# Patient Record
Sex: FEMALE | Race: BLACK OR AFRICAN AMERICAN | NOT HISPANIC OR LATINO | Employment: OTHER | URBAN - METROPOLITAN AREA
[De-identification: names, ages, dates, MRNs, and addresses within clinical notes are randomized per-mention and may not be internally consistent; named-entity substitution may affect disease eponyms.]

---

## 2017-01-31 ENCOUNTER — OFFICE VISIT (OUTPATIENT)
Dept: INTERNAL MEDICINE | Facility: CLINIC | Age: 72
End: 2017-01-31

## 2017-01-31 VITALS
DIASTOLIC BLOOD PRESSURE: 88 MMHG | SYSTOLIC BLOOD PRESSURE: 143 MMHG | WEIGHT: 178.4 LBS | HEART RATE: 52 BPM | BODY MASS INDEX: 32.83 KG/M2 | HEIGHT: 62 IN

## 2017-01-31 DIAGNOSIS — I10 BENIGN ESSENTIAL HYPERTENSION: Primary | ICD-10-CM

## 2017-01-31 PROCEDURE — 99213 OFFICE O/P EST LOW 20 MIN: CPT | Performed by: INTERNAL MEDICINE

## 2017-01-31 RX ORDER — DILTIAZEM HYDROCHLORIDE 300 MG/1
300 CAPSULE, COATED, EXTENDED RELEASE ORAL DAILY
Qty: 90 CAPSULE | Refills: 1 | Status: SHIPPED | OUTPATIENT
Start: 2017-01-31 | End: 2017-09-10

## 2017-01-31 NOTE — PROGRESS NOTES
Subjective   Christine Quick is a 71 y.o. female.     Chief Complaint   Patient presents with   • Hypertension       History of Present Illness  F/u HTN. BP is high but states she was rushing today to get here. She denies CP, SOA, HA or blurry vision.  She does not check her BP at home.    Review of Systems   Constitutional: Negative for chills and fatigue.   Respiratory: Negative for shortness of breath.    Cardiovascular: Negative for chest pain and leg swelling.   Gastrointestinal: Negative for abdominal distention, abdominal pain, constipation and diarrhea.   Psychiatric/Behavioral: Negative for sleep disturbance and suicidal ideas. The patient is not nervous/anxious.    All other systems reviewed and are negative.      The following portions of the patient's history were reviewed and updated as appropriate: past family history, past medical history, past social history and past surgical history.    Objective   Physical Exam   Constitutional: She is oriented to person, place, and time. She appears well-developed and well-nourished. No distress.   Cardiovascular: Normal rate, regular rhythm and normal heart sounds.    No murmur heard.  Pulmonary/Chest: Effort normal and breath sounds normal. No respiratory distress. She has no wheezes.   Abdominal: Soft. Bowel sounds are normal. She exhibits no distension. There is no tenderness.   Musculoskeletal: She exhibits no edema.   Neurological: She is alert and oriented to person, place, and time.   Skin: She is not diaphoretic.   Psychiatric: She has a normal mood and affect. Her behavior is normal.   Nursing note and vitals reviewed.      Assessment/Plan   Christine was seen today for hypertension.    Diagnoses and all orders for this visit:    Benign essential hypertension  -     diltiaZEM CD (CARDIZEM CD) 300 MG 24 hr capsule; Take 1 capsule by mouth Daily.    -HTN: BP high even with repeat BP check (145/90). Will increase Diltiazem from 240 mg to 300 mg daily. Monitor BP  at home and send or bring in BP log in 1 month. Goal BP reading of <140/90. Low Na and caffeine intake.

## 2017-01-31 NOTE — MR AVS SNAPSHOT
Christine Quick   1/31/2017 10:20 AM   Office Visit    Dept Phone:  [phone]   Encounter #:  [id number]    Provider:  Radha Rodriguez MD   Department:  [facility] INTERNAL MEDICINE                Your Full Care Plan              Today's Medication Changes          These changes are accurate as of: 1/31/17 10:52 AM.  If you have any questions, ask your nurse or doctor.               Medication(s)that have changed:     diltiaZEM  MG 24 hr capsule   Commonly known as:  CARDIZEM CD   Take 1 capsule by mouth Daily.   What changed:    - medication strength  - how much to take  - Another medication with the same name was removed. Continue taking this medication, and follow the directions you see here.   Changed by:  Radha Rodriguez MD            Where to Get Your Medications      These medications were sent to [facility] [place] [address] [place] [address] [address] [facility] [address] [facility] [address] [facility] - [phone]  - [phone]   [address] [address]     Phone:  [phone]     diltiaZEM  MG 24 hr capsule                  Your Updated Medication List          This list is accurate as of: 1/31/17 10:52 AM.  Always use your most recent med list.                B-complex with vitamin C tablet       calcium carbonate-vitamin d 600-400 MG-UNIT per tablet       desonide 0.05 % cream   Commonly known as:  DESOWEN       diltiaZEM  MG 24 hr capsule   Commonly known as:  CARDIZEM CD   Take 1 capsule by mouth Daily.       fexofenadine 60 MG tablet   Commonly known as:  ALLEGRA       Flaxseed Oil 1200 MG capsule       MULTI VITAMIN DAILY PO       prednisoLONE acetate 1 % ophthalmic suspension   Commonly known as:  PRED FORTE       triamterene-hydrochlorothiazide 75-50 MG per tablet   Commonly known as:  MAXZIDE   TAKE 1/2 TABLET BY MOUTH EVERY DAY       Vitamin D3 3000 UNITS tablet               You Were Diagnosed With        "Codes Comments    Benign essential hypertension    -  Primary ICD-10-CM: I10  ICD-9-CM: 401.1       Instructions     None    Patient Instructions History      Upcoming Appointments     Visit Type Date Time Department    OFFICE VISIT 2017 10:20 AM MARIA ESTHER MILNER      [facility] Signup     [facility] allows you to send messages to your doctor, view your test results, renew your prescriptions, schedule appointments, and more. To sign up, go to [employer] and click on the Sign Up Now link in the New User? box. Enter your [facility] Activation Code exactly as it appears below along with the last four digits of your Social Security Number and your Date of Birth () to complete the sign-up process. If you do not sign up before the expiration date, you must request a new code.    [facility] Activation Code: [email or web address]  Expires: 2017 10:52 AM    If you have questions, you can email [email or web address] or call [phone] to talk to our [facility] staff. Remember, [facility] is NOT to be used for urgent needs. For medical emergencies, dial 911.               Other Info from Your Visit           Allergies     Albumen, Egg      Amlodipine  Swelling      Reason for Visit     Hypertension           Vital Signs     Blood Pressure Pulse Height Weight Body Mass Index Smoking Status    143/88 (BP Location: Left arm, Patient Position: Sitting, Cuff Size: Adult) 52 62\" (157.5 cm) 178 lb 6.4 oz (80.9 kg) 32.63 kg/m2 Never Smoker      Problems and Diagnoses Noted     Benign essential hypertension        "

## 2017-10-31 ENCOUNTER — OFFICE VISIT (OUTPATIENT)
Dept: FAMILY MEDICINE CLINIC | Facility: CLINIC | Age: 72
End: 2017-10-31

## 2017-10-31 VITALS
TEMPERATURE: 97.1 F | DIASTOLIC BLOOD PRESSURE: 92 MMHG | OXYGEN SATURATION: 95 % | HEART RATE: 63 BPM | BODY MASS INDEX: 32.53 KG/M2 | HEIGHT: 63 IN | SYSTOLIC BLOOD PRESSURE: 183 MMHG | WEIGHT: 183.6 LBS

## 2017-10-31 DIAGNOSIS — Z11.59 ENCOUNTER FOR HEPATITIS C SCREENING TEST FOR LOW RISK PATIENT: ICD-10-CM

## 2017-10-31 DIAGNOSIS — I10 BENIGN ESSENTIAL HYPERTENSION: Primary | ICD-10-CM

## 2017-10-31 DIAGNOSIS — E66.9 OBESITY (BMI 30-39.9): ICD-10-CM

## 2017-10-31 DIAGNOSIS — Z13.1 SCREENING FOR DIABETES MELLITUS: ICD-10-CM

## 2017-10-31 DIAGNOSIS — Z13.220 SCREENING FOR HYPERLIPIDEMIA: ICD-10-CM

## 2017-10-31 PROCEDURE — 99214 OFFICE O/P EST MOD 30 MIN: CPT | Performed by: FAMILY MEDICINE

## 2017-10-31 RX ORDER — HYDROCHLOROTHIAZIDE 25 MG/1
25 TABLET ORAL DAILY
Qty: 30 TABLET | Refills: 1 | Status: SHIPPED | OUTPATIENT
Start: 2017-10-31 | End: 2017-11-13 | Stop reason: ALTCHOICE

## 2017-10-31 RX ORDER — DILTIAZEM HYDROCHLORIDE 240 MG/1
240 CAPSULE, COATED, EXTENDED RELEASE ORAL DAILY
Qty: 30 CAPSULE | Refills: 2 | Status: SHIPPED | OUTPATIENT
Start: 2017-10-31 | End: 2018-03-30 | Stop reason: SDUPTHER

## 2017-10-31 NOTE — PROGRESS NOTES
Subjective   Christine Quick is a 72 y.o. female.  Patient presented to the office alone today to establish care.  Her prior PCP Dr. Devine is retired.  She has long-standing history of hypertension, eczema, and chronic seasonal allergies.    Chief Complaint   Patient presents with   • Hypertension     new pt get established         History of Present Illness  Hypertension  Patient has been taking diltiazem 240 mg extended release and triamterene hydrochlorothiazide 75-50 milligrams daily without dose change for the last at least several years.  She has not had her blood pressure medication yet today.  Her last dose was yesterday afternoon.  She usually doesn't have trouble taking it daily but she had a meeting today and has been doing a lot of running around.  She says usually she doesn't have trouble with her blood pressure being elevated at doctor's appointments.  She does note that when she takes the medications together she is not necessarily dizzy but does feel a little bit funny on the inside.  She also notices that she has been extra tired following the dose of blood pressure medications for about 2-3 hours and then it improves.  This has been going on for months.  She does have some trouble with the triamterene and hydrochlorothiazide dosing because if she takes it too late in the day it will affect her overnight related to increased need of using the restroom to urinate.  She denies headache, change in vision, chest pain, shortness of breath.    Eczema  She had severe eczema as a child.  It has steadily improved with age according to her.  She still sees dermatology for that.  She says it tends to be exacerbated by stressful events in her life.  During these times she just uses topical desonide.  Otherwise she doesn't require any special creams consistently.    Allergic rhinitis  Patient is currently experiencing some left-sided ear and throat discomfort.  She is not really having pain.  She does not complain  of nasal congestion, cough, sore throat, sinus pressure, headaches, fever, or chills.  When she has symptoms such as the ones going on now, she takes fexofenadine over-the-counter once daily but does not do this consistently.  He is to help her with her symptoms and if she has a headache related to the symptoms.    Recurrent eye infections  For this problem she is seen by ophthalmology, Dr. Castellano.  She reported it was explained to her she has like a herpes infection of the eye.  This only affects the right eye.  It is intermittent in frequency.  For her symptoms she takes a steroid eyedrop that has been prescribed to her.  She begins the drops at the onset of increased blurry vision, redness, and watery eye and notices improvement over the course of a few days.  Her vision in her right eye is intact but notably worse than the left.      The following portions of the patient's history were reviewed and updated as appropriate: allergies, current medications, past family history, past medical history, past social history, past surgical history and problem list.      Review of Systems   Constitutional: Negative for appetite change, chills, fatigue, fever and unexpected weight change.        Patient is working on increasing her activity.  She and her  own a cleaning company, and she has a daily job that involves walking about 1 mile, and up and down 60 steps which she did yesterday and had no trouble with.   HENT: Negative for congestion, ear pain, postnasal drip, rhinorrhea, sinus pain, sinus pressure, sore throat, trouble swallowing and voice change.    Eyes: Positive for redness and visual disturbance. Negative for discharge.        Patient has intermittent flare of recurrent eye viral infection.  She is currently having a mild exacerbation and is experiencing some blurry vision and redness.   Respiratory: Negative for cough and shortness of breath.    Cardiovascular: Negative for chest pain and leg swelling.  "  Gastrointestinal: Negative for abdominal pain, blood in stool, constipation, diarrhea, nausea and vomiting.   Genitourinary: Positive for urgency. Negative for decreased urine volume, difficulty urinating, dysuria and frequency.   Musculoskeletal: Negative for back pain, gait problem and joint swelling.   Skin: Negative for rash and wound.   Neurological: Negative for dizziness and headaches.   Psychiatric/Behavioral: Positive for sleep disturbance. Negative for dysphoric mood. The patient is not nervous/anxious.        Objective   Blood pressure (!) 183/92, pulse 63, temperature 97.1 °F (36.2 °C), height 63\" (160 cm), weight 183 lb 9.6 oz (83.3 kg), SpO2 95 %.  Physical Exam   Constitutional: She is oriented to person, place, and time. She appears well-nourished. No distress.   HENT:   Right Ear: External ear normal.   Left Ear: External ear normal.   Nose: Nose normal.   Eyes: Conjunctivae and EOM are normal. Right eye exhibits no discharge. Left eye exhibits no discharge. No scleral icterus.   Slight conjunctival injection right greater than left.   Neck: Neck supple. No thyromegaly present.   Cardiovascular: Normal rate, regular rhythm, normal heart sounds and intact distal pulses.  Exam reveals no gallop and no friction rub.    No murmur heard.  Pulmonary/Chest: Effort normal and breath sounds normal. No respiratory distress. She has no wheezes. She has no rales.   Abdominal: Soft. Bowel sounds are normal. She exhibits no distension and no mass. There is no tenderness. There is no guarding.   Musculoskeletal: She exhibits no edema or deformity.   No tenderness along the spine.   Lymphadenopathy:     She has no cervical adenopathy.   Neurological: She is alert and oriented to person, place, and time. She exhibits normal muscle tone. Coordination normal.   Skin: Skin is warm and dry.   Psychiatric: She has a normal mood and affect. Her behavior is normal.       Assessment/Plan   Christine was seen today for " hypertension.    Diagnoses and all orders for this visit:    Benign essential hypertension  -     diltiaZEM CD (CARDIZEM CD) 240 MG 24 hr capsule; Take 1 capsule by mouth Daily.  -     CBC & Differential  -     Comprehensive Metabolic Panel    Obesity (BMI 30-39.9)  -     CBC & Differential  -     Comprehensive Metabolic Panel  -     Lipid Panel    Screening for hyperlipidemia  -     Lipid Panel    Encounter for hepatitis C screening test for low risk patient  -     Hepatitis C Antibody    Screening for diabetes mellitus  -     Comprehensive Metabolic Panel    Other orders  -     hydrochlorothiazide (HYDRODIURIL) 25 MG tablet; Take 1 tablet by mouth Daily.      Hypertension repeat blood pressure in office was 170/92  #1 reviewed other blood pressures in patient's chart.  Systolic blood pressure not noted to be higher than 149.  #2 some concern with compliance given that she has symptoms of fatigue and not feeling well when they're taken together, and having trouble taking the hydrochlorothiazide triamterene late in the day because of urinary symptoms at night.  #3 discussed medication options at length including dosing time of current medicines, splitting current medicines into individual doses, changing medications to another class including ACE inhibitor or ARB because it's unclear why the patient is not on one of these classes  #4 we'll continue diltiazem 240 mg extended release for patient to take in the morning, will maintain hydrochlorothiazide 25 mg dose daily, patient will take this late morning or at lunchtime and avoid taking it later than lunch as it will affect her sleep  #5 patient was enthusiastic about this routine and said she will work to be compliant.  #6 we'll follow-up on her blood pressure with this change in medication in 2 weeks

## 2017-11-13 ENCOUNTER — OFFICE VISIT (OUTPATIENT)
Dept: FAMILY MEDICINE CLINIC | Facility: CLINIC | Age: 72
End: 2017-11-13

## 2017-11-13 VITALS
OXYGEN SATURATION: 97 % | WEIGHT: 181.5 LBS | TEMPERATURE: 98.6 F | HEIGHT: 63 IN | DIASTOLIC BLOOD PRESSURE: 80 MMHG | SYSTOLIC BLOOD PRESSURE: 135 MMHG | BODY MASS INDEX: 32.16 KG/M2 | HEART RATE: 54 BPM

## 2017-11-13 DIAGNOSIS — I10 BENIGN ESSENTIAL HYPERTENSION: Primary | ICD-10-CM

## 2017-11-13 PROCEDURE — 99213 OFFICE O/P EST LOW 20 MIN: CPT | Performed by: FAMILY MEDICINE

## 2017-11-13 RX ORDER — TRIAMTERENE AND HYDROCHLOROTHIAZIDE 37.5; 25 MG/1; MG/1
2 TABLET ORAL DAILY
Qty: 60 TABLET | Refills: 3 | Status: SHIPPED | OUTPATIENT
Start: 2017-11-13 | End: 2018-03-18 | Stop reason: SDUPTHER

## 2017-11-13 NOTE — PROGRESS NOTES
"Subjective   Christine Quick is a 72 y.o. female.  She presented for follow-up of her hypertension.    Chief Complaint   Patient presents with   • Hypertension     2 week follow up        History of Present Illness  Hypertension  Patient has been taking her diltiazem 240 mg extended release and her triamterene hydrochlorothiazide 75-50 every morning.  Last time she reported dizziness with taking both of her medications at the same time but she has had no symptoms like this.  She has been exercising and walking more.  She does not get any chest pain or excessive shortness of breath with these activities.  She reports overall feeling very well since the last appointment.  She had not picked up the hydrochlorothiazide 25 mg from last appointment until yesterday.  She has not yet taken this.  She also stated that her urinary complaints and increased urinary frequency overnight was not a problem when taking her triamterene hydrochlorothiazide in the morning.  She would prefer to continue on her current regimen as opposed to change medications at this time.  She denied headache, changes in her baseline vision, lower extremity swelling.    She has history of shingles and is concerned about getting the zoster vaccine.  She said she has a history of egg allergy, but she currently tolerates eating eggs prepared in food.  He has avoided flu vaccine and other vaccines because of her history of egg allergy.    The following portions of the patient's history were reviewed and updated as appropriate: allergies, current medications, past surgical history and problem list.          Review of Systems   Constitutional: Negative for activity change and unexpected weight change.   Respiratory: Negative for shortness of breath.    Cardiovascular: Negative for chest pain and leg swelling.   Neurological: Negative for dizziness and headaches.       Objective   Blood pressure 135/80, pulse 54, temperature 98.6 °F (37 °C), height 63\" (160 cm), " weight 181 lb 8 oz (82.3 kg), SpO2 97 %.  Physical Exam   Constitutional: She appears well-nourished. No distress.   Eyes: Conjunctivae are normal. Right eye exhibits no discharge. Left eye exhibits no discharge. No scleral icterus.   Cardiovascular: Normal rate, regular rhythm and normal heart sounds.  Exam reveals no gallop and no friction rub.    No murmur heard.  Pulmonary/Chest: Effort normal and breath sounds normal. No respiratory distress.   Musculoskeletal: She exhibits no edema.   Vitals reviewed.      Assessment/Plan   Christine was seen today for hypertension.    Diagnoses and all orders for this visit:    Benign essential hypertension    Other orders  -     triamterene-hydrochlorothiazide (MAXZIDE-25) 37.5-25 MG per tablet; Take 2 tablets by mouth Daily.      Blood pressure shows good control today.  There is no need to change patient's regimen as she is tolerating it well at this time.  Will refill her triamterene hydrochlorothiazide and cancel the hydrochlorothiazide 25 mg.  Discussed could've been some dehydration from acute illness likely viral that she was experiencing last time with feeling tired and dizzy when she took all of her blood pressure medications.  Patient encouraged to continue her increased physical activity.  She was also counseled on benefits of vaccinations, and despite her egg allergy vaccines would be recommended for her.  She said she did not want to vaccine today and she will think about it.  To return for follow-up in the office in 3 months.

## 2018-01-15 ENCOUNTER — TELEPHONE (OUTPATIENT)
Dept: FAMILY MEDICINE CLINIC | Facility: CLINIC | Age: 73
End: 2018-01-15

## 2018-02-06 ENCOUNTER — TELEPHONE (OUTPATIENT)
Dept: FAMILY MEDICINE CLINIC | Facility: CLINIC | Age: 73
End: 2018-02-06

## 2018-02-06 NOTE — TELEPHONE ENCOUNTER
Patient called requesting a colonoscopy referral sent in and has an appointment for fasting labs on 2/14, patient did not say which labs she needed for her insurance.

## 2018-02-07 DIAGNOSIS — I10 BENIGN ESSENTIAL HYPERTENSION: Primary | ICD-10-CM

## 2018-02-07 DIAGNOSIS — Z12.11 SCREENING FOR COLON CANCER: ICD-10-CM

## 2018-02-07 DIAGNOSIS — E66.9 OBESITY (BMI 30-39.9): ICD-10-CM

## 2018-02-07 DIAGNOSIS — R73.01 IMPAIRED FASTING GLUCOSE: ICD-10-CM

## 2018-02-14 LAB
ALBUMIN SERPL-MCNC: 4.2 G/DL (ref 3.5–5.2)
ALBUMIN/GLOB SERPL: 1.6 G/DL
ALP SERPL-CCNC: 85 U/L (ref 39–117)
ALT SERPL-CCNC: 21 U/L (ref 1–33)
AST SERPL-CCNC: 27 U/L (ref 1–32)
BILIRUB SERPL-MCNC: 0.4 MG/DL (ref 0.1–1.2)
BUN SERPL-MCNC: 18 MG/DL (ref 8–23)
BUN/CREAT SERPL: 25 (ref 7–25)
CALCIUM SERPL-MCNC: 9.3 MG/DL (ref 8.6–10.5)
CHLORIDE SERPL-SCNC: 100 MMOL/L (ref 98–107)
CHOLEST SERPL-MCNC: 202 MG/DL (ref 0–200)
CO2 SERPL-SCNC: 31.2 MMOL/L (ref 22–29)
CREAT SERPL-MCNC: 0.72 MG/DL (ref 0.57–1)
GFR SERPLBLD CREATININE-BSD FMLA CKD-EPI: 79 ML/MIN/1.73
GFR SERPLBLD CREATININE-BSD FMLA CKD-EPI: 96 ML/MIN/1.73
GLOBULIN SER CALC-MCNC: 2.7 GM/DL
GLUCOSE SERPL-MCNC: 89 MG/DL (ref 65–99)
HBA1C MFR BLD: 5.42 % (ref 4.8–5.6)
HDLC SERPL-MCNC: 95 MG/DL (ref 40–60)
LDLC SERPL CALC-MCNC: 95 MG/DL (ref 0–100)
POTASSIUM SERPL-SCNC: 3.7 MMOL/L (ref 3.5–5.2)
PROT SERPL-MCNC: 6.9 G/DL (ref 6–8.5)
SODIUM SERPL-SCNC: 143 MMOL/L (ref 136–145)
TRIGL SERPL-MCNC: 62 MG/DL (ref 0–150)
VLDLC SERPL CALC-MCNC: 12.4 MG/DL (ref 5–40)

## 2018-03-01 ENCOUNTER — PREP FOR SURGERY (OUTPATIENT)
Dept: OTHER | Facility: HOSPITAL | Age: 73
End: 2018-03-01

## 2018-03-01 DIAGNOSIS — Z12.11 SCREEN FOR COLON CANCER: Primary | ICD-10-CM

## 2018-03-05 PROBLEM — Z12.11 SCREEN FOR COLON CANCER: Status: ACTIVE | Noted: 2018-03-05

## 2018-03-19 RX ORDER — TRIAMTERENE AND HYDROCHLOROTHIAZIDE 37.5; 25 MG/1; MG/1
2 TABLET ORAL DAILY
Qty: 60 TABLET | Refills: 0 | Status: SHIPPED | OUTPATIENT
Start: 2018-03-19 | End: 2018-03-30

## 2018-03-28 ENCOUNTER — TELEPHONE (OUTPATIENT)
Dept: SURGERY | Facility: CLINIC | Age: 73
End: 2018-03-28

## 2018-03-28 NOTE — TELEPHONE ENCOUNTER
Left message on cell phone. Tried to reach patient on home # twice with no success. Left her a message regarding her c-scope and apologized that I did not know she cx her procedure( she may have spoken to Yudy or answering service) but most patients call back at a later date to r/s.

## 2018-03-29 ENCOUNTER — PREP FOR SURGERY (OUTPATIENT)
Dept: OTHER | Facility: HOSPITAL | Age: 73
End: 2018-03-29

## 2018-03-29 ENCOUNTER — TELEPHONE (OUTPATIENT)
Dept: SURGERY | Facility: CLINIC | Age: 73
End: 2018-03-29

## 2018-03-29 DIAGNOSIS — Z12.11 SCREENING FOR MALIGNANT NEOPLASM OF COLON: Primary | ICD-10-CM

## 2018-03-29 NOTE — TELEPHONE ENCOUNTER
Orders have been put in.  Okay to use to make citrate prep this time.  I think if she has further numbness in her arm she should probably contact her primary care physician.  Dwayne to see how that would be related to her prep.

## 2018-03-29 NOTE — TELEPHONE ENCOUNTER
Pt was scheduled for c-scope last Thursday. Had a very bad reaction to prep(she had taken Miralax since Suprep was not covered by insurance). She states she was violently vomiting, had numbness in her arm and became extremely weak. Told her I would speak with you on what to suggest as she is very anxious and slightly scared of doing another prep. She did state in the past she has used Mag Citrate and apparently that was ok for her. Should we try to r/s c-scope and do Mag cit prep? Her biggest concern in all of this was the fact of the numbness in her arm. Says she is still trying to recover from feeling so bad last week. If ok to do c-scope please put orders in. Or please let me know if we need to do something else.

## 2018-03-30 ENCOUNTER — OFFICE VISIT (OUTPATIENT)
Dept: FAMILY MEDICINE CLINIC | Facility: CLINIC | Age: 73
End: 2018-03-30

## 2018-03-30 VITALS
HEIGHT: 63 IN | HEART RATE: 68 BPM | OXYGEN SATURATION: 99 % | BODY MASS INDEX: 31.71 KG/M2 | DIASTOLIC BLOOD PRESSURE: 72 MMHG | WEIGHT: 179 LBS | SYSTOLIC BLOOD PRESSURE: 130 MMHG

## 2018-03-30 DIAGNOSIS — I10 BENIGN ESSENTIAL HYPERTENSION: ICD-10-CM

## 2018-03-30 DIAGNOSIS — R35.0 URINE FREQUENCY: ICD-10-CM

## 2018-03-30 DIAGNOSIS — Z12.11 ENCOUNTER FOR SCREENING COLONOSCOPY: Primary | ICD-10-CM

## 2018-03-30 LAB
BILIRUB BLD-MCNC: NEGATIVE MG/DL
CLARITY, POC: CLEAR
COLOR UR: YELLOW
GLUCOSE UR STRIP-MCNC: NEGATIVE MG/DL
KETONES UR QL: NEGATIVE
LEUKOCYTE EST, POC: NEGATIVE
NITRITE UR-MCNC: NEGATIVE MG/ML
PH UR: 7 [PH] (ref 5–8)
PROT UR STRIP-MCNC: NEGATIVE MG/DL
RBC # UR STRIP: NEGATIVE /UL
SP GR UR: 1.01 (ref 1–1.03)
UROBILINOGEN UR QL: NORMAL

## 2018-03-30 PROCEDURE — 81003 URINALYSIS AUTO W/O SCOPE: CPT | Performed by: FAMILY MEDICINE

## 2018-03-30 PROCEDURE — 99214 OFFICE O/P EST MOD 30 MIN: CPT | Performed by: FAMILY MEDICINE

## 2018-03-30 RX ORDER — DILTIAZEM HYDROCHLORIDE 240 MG/1
240 CAPSULE, COATED, EXTENDED RELEASE ORAL DAILY
Qty: 90 CAPSULE | Refills: 1 | Status: SHIPPED | OUTPATIENT
Start: 2018-03-30 | End: 2018-11-19

## 2018-03-30 RX ORDER — HYDROCHLOROTHIAZIDE 12.5 MG/1
12.5 TABLET ORAL DAILY
Qty: 90 TABLET | Refills: 1 | Status: SHIPPED | OUTPATIENT
Start: 2018-03-30 | End: 2018-12-11 | Stop reason: SDUPTHER

## 2018-03-30 RX ORDER — OXYBUTYNIN CHLORIDE 5 MG/1
5 TABLET, EXTENDED RELEASE ORAL DAILY
Qty: 30 TABLET | Refills: 1 | Status: SHIPPED | OUTPATIENT
Start: 2018-03-30 | End: 2018-06-28

## 2018-06-13 ENCOUNTER — TELEPHONE (OUTPATIENT)
Dept: GASTROENTEROLOGY | Facility: CLINIC | Age: 73
End: 2018-06-13

## 2018-06-13 DIAGNOSIS — I10 BENIGN ESSENTIAL HYPERTENSION: ICD-10-CM

## 2018-06-13 NOTE — TELEPHONE ENCOUNTER
Spoke with patient and informed her that Dr Ngo requests to see her in the office prior to scheduling any procedures.  Patient voiced understanding.  New patient appointment scheduled 8/22/18 at 0845.

## 2018-06-13 NOTE — TELEPHONE ENCOUNTER
----- Message from Hunter Ngo MD sent at 6/8/2018  9:01 AM EDT -----  Regarding: RE: OA PAPERWORK  Office visit please  To discuss bowel prep and possible alternative screening options.  To also discuss if pt really needs colonoscopy at this time.  It was recommended pt have f/u in 5 years after her last colonoscopy it was normal, and f/u interval is usually 10 years.      ----- Message -----  From: Sonali Núñez RN  Sent: 6/7/2018   2:11 PM  To: Hunter Ngo MD  Subject: OA PAPERWORK                                     Open Access/Recall paperwork scanned in under the media tab.  Please review and return to Sonali.

## 2018-06-14 RX ORDER — DILTIAZEM HYDROCHLORIDE 240 MG/1
240 CAPSULE, EXTENDED RELEASE ORAL DAILY
Qty: 90 CAPSULE | Refills: 0 | Status: SHIPPED | OUTPATIENT
Start: 2018-06-14 | End: 2018-06-28 | Stop reason: SDUPTHER

## 2018-06-22 ENCOUNTER — OFFICE VISIT (OUTPATIENT)
Dept: GASTROENTEROLOGY | Facility: CLINIC | Age: 73
End: 2018-06-22

## 2018-06-22 VITALS
TEMPERATURE: 98 F | SYSTOLIC BLOOD PRESSURE: 134 MMHG | HEIGHT: 62 IN | DIASTOLIC BLOOD PRESSURE: 74 MMHG | BODY MASS INDEX: 34.23 KG/M2 | WEIGHT: 186 LBS

## 2018-06-22 DIAGNOSIS — Z80.0 FAMILY HX OF COLON CANCER: Primary | ICD-10-CM

## 2018-06-22 PROCEDURE — 99202 OFFICE O/P NEW SF 15 MIN: CPT | Performed by: INTERNAL MEDICINE

## 2018-06-28 ENCOUNTER — OFFICE VISIT (OUTPATIENT)
Dept: FAMILY MEDICINE CLINIC | Facility: CLINIC | Age: 73
End: 2018-06-28

## 2018-06-28 VITALS
HEIGHT: 62 IN | SYSTOLIC BLOOD PRESSURE: 128 MMHG | HEART RATE: 78 BPM | DIASTOLIC BLOOD PRESSURE: 88 MMHG | WEIGHT: 184 LBS | OXYGEN SATURATION: 99 % | BODY MASS INDEX: 33.86 KG/M2

## 2018-06-28 DIAGNOSIS — Z00.00 MEDICARE ANNUAL WELLNESS VISIT, INITIAL: Primary | ICD-10-CM

## 2018-06-28 PROCEDURE — G0438 PPPS, INITIAL VISIT: HCPCS | Performed by: FAMILY MEDICINE

## 2018-06-28 NOTE — PROGRESS NOTES
QUICK REFERENCE INFORMATION:  The ABCs of the Annual Wellness Visit    Initial Medicare Wellness Visit    HEALTH RISK ASSESSMENT    1945    Recent Hospitalizations:  No hospitalization(s) within the last year..        Current Medical Providers:  Patient Care Team:  Tamiko Frederick MD as PCP - General (Family Medicine)  Marcell Castellano MD as PCP - Claims Attributed        Smoking Status:  History   Smoking Status   • Never Smoker   Smokeless Tobacco   • Never Used       Alcohol Consumption:  History   Alcohol Use   • 0.6 oz/week   • 1 Glasses of wine per week     Comment: special occasions       Depression Screen:   PHQ-2/PHQ-9 Depression Screening 6/28/2018   Little interest or pleasure in doing things 0   Feeling down, depressed, or hopeless 0   Total Score 0       Health Habits and Functional and Cognitive Screening:  Functional & Cognitive Status 6/28/2018   Do you have difficulty preparing food and eating? No   Do you have difficulty bathing yourself, getting dressed or grooming yourself? No   Do you have difficulty using the toilet? No   Do you have difficulty moving around from place to place? No   Do you have trouble with steps or getting out of a bed or a chair? No   In the past year have you fallen or experienced a near fall? No   Current Diet Well Balanced Diet   Dental Exam Up to date   Eye Exam Up to date   Exercise (times per week) 1 times per week   Current Exercise Activities Include Walking   Do you need help using the phone?  No   Are you deaf or do you have serious difficulty hearing?  No   Do you need help with transportation? No   Do you need help shopping? No   Do you need help preparing meals?  No   Do you need help with housework?  No   Do you need help with laundry? No   Do you need help taking your medications? No   Do you need help managing money? No   Do you ever drive or ride in a car without wearing a seat belt? No   Have you felt unusual stress, anger or loneliness in the last month?  No   Who do you live with? Spouse   If you need help, do you have trouble finding someone available to you? No   Have you been bothered in the last four weeks by sexual problems? No   Do you have difficulty concentrating, remembering or making decisions? No           Does the patient have evidence of cognitive impairment? No    Asiprin use counseling: Taking ASA appropriately as indicated      Recent Lab Results:    Visual Acuity:  No exam data present    Age-appropriate Screening Schedule:  Refer to the list below for future screening recommendations based on patient's age, sex and/or medical conditions. Orders for these recommended tests are listed in the plan section. The patient has been provided with a written plan.    Health Maintenance   Topic Date Due   • ZOSTER VACCINE (1 of 2) 08/13/1994   • TDAP/TD VACCINES (1 - Tdap) 12/02/2008   • PNEUMOCOCCAL VACCINES (65+ LOW/MEDIUM RISK) (1 of 2 - PCV13) 08/13/2009   • DXA SCAN  11/30/2015   • COLONOSCOPY  12/13/2016   • MAMMOGRAM  02/26/2017   • INFLUENZA VACCINE  08/01/2018        Subjective   History of Present Illness    Christine Quick is a 72 y.o. female who presents for an Annual Wellness Visit.    The following portions of the patient's history were reviewed and updated as appropriate: allergies, current medications, past family history, past medical history, past social history, past surgical history and problem list.    Outpatient Medications Prior to Visit   Medication Sig Dispense Refill   • B Complex-C (B-COMPLEX WITH VITAMIN C) tablet Take by mouth.     • calcium carbonate-vitamin d 600-400 MG-UNIT per tablet Take by mouth.     • Cholecalciferol (VITAMIN D3) 3000 UNITS tablet Take by mouth.     • desonide (DESOWEN) 0.05 % cream APPLY TOPICALLY BID SPARINGLY AND RUB GENTLY INTO THE AFFECTED AREAS  2   • diltiaZEM (TIAZAC) 240 MG 24 hr capsule TAKE 1 CAPSULE BY MOUTH DAILY 90 capsule 0   • fexofenadine (ALLEGRA) 60 MG tablet Take by mouth.     • Flaxseed,  "Linseed, (FLAXSEED OIL) 1200 MG capsule Take by mouth.     • hydrochlorothiazide (HYDRODIURIL) 12.5 MG tablet Take 1 tablet by mouth Daily. 90 tablet 1   • Multiple Vitamin (MULTI VITAMIN DAILY PO) Take by mouth.     • oxybutynin XL (DITROPAN-XL) 5 MG 24 hr tablet Take 1 tablet by mouth Daily. 30 tablet 1   • prednisoLONE acetate (PRED FORTE) 1 % ophthalmic suspension   0   • diltiaZEM CD (CARDIZEM CD) 240 MG 24 hr capsule Take 1 capsule by mouth Daily. 90 capsule 1     No facility-administered medications prior to visit.        Patient Active Problem List   Diagnosis   • Anxiety   • Benign essential hypertension   • Impaired fasting glucose   • Shoulder pain   • Obesity (BMI 30-39.9)   • Urge incontinence of urine   • Screening for colon cancer   • Screen for colon cancer       Advance Care Planning:  has an advance directive - a copy HAS NOT been provided    Identification of Risk Factors:  Risk factors include: weight , chronic pain and caretaker stress.    Review of Systems    Compared to one year ago, the patient feels her physical health is better.  Compared to one year ago, the patient feels her mental health is the same.    Objective     Physical Exam    Vitals:    06/28/18 0816   BP: 128/88   BP Location: Left arm   Patient Position: Sitting   Cuff Size: Large Adult   Pulse: 78   SpO2: 99%   Weight: 83.5 kg (184 lb)   Height: 157.5 cm (62\")       Patient's Body mass index is 33.65 kg/m². BMI is above normal parameters. Recommendations include: exercise counseling and nutrition counseling.      Assessment/Plan   Patient Self-Management and Personalized Health Advice  The patient has been provided with information about: diet, exercise, fall prevention, supplements and mental health concerns and preventive services including:   · Exercise counseling provided, Fall Risk assessment done, Nutrition counseling provided.    Visit Diagnoses:  No diagnosis found.    No orders of the defined types were placed in this " encounter.      Outpatient Encounter Prescriptions as of 6/28/2018   Medication Sig Dispense Refill   • B Complex-C (B-COMPLEX WITH VITAMIN C) tablet Take by mouth.     • calcium carbonate-vitamin d 600-400 MG-UNIT per tablet Take by mouth.     • Cholecalciferol (VITAMIN D3) 3000 UNITS tablet Take by mouth.     • desonide (DESOWEN) 0.05 % cream APPLY TOPICALLY BID SPARINGLY AND RUB GENTLY INTO THE AFFECTED AREAS  2   • diltiaZEM (TIAZAC) 240 MG 24 hr capsule TAKE 1 CAPSULE BY MOUTH DAILY 90 capsule 0   • fexofenadine (ALLEGRA) 60 MG tablet Take by mouth.     • Flaxseed, Linseed, (FLAXSEED OIL) 1200 MG capsule Take by mouth.     • hydrochlorothiazide (HYDRODIURIL) 12.5 MG tablet Take 1 tablet by mouth Daily. 90 tablet 1   • Multiple Vitamin (MULTI VITAMIN DAILY PO) Take by mouth.     • oxybutynin XL (DITROPAN-XL) 5 MG 24 hr tablet Take 1 tablet by mouth Daily. 30 tablet 1   • prednisoLONE acetate (PRED FORTE) 1 % ophthalmic suspension   0   • diltiaZEM CD (CARDIZEM CD) 240 MG 24 hr capsule Take 1 capsule by mouth Daily. 90 capsule 1     No facility-administered encounter medications on file as of 6/28/2018.        Reviewed use of high risk medication in the elderly: yes  Reviewed for potential of harmful drug interactions in the elderly: yes    Follow Up:  No Follow-up on file.     An After Visit Summary and PPPS with all of these plans were given to the patient.

## 2018-09-09 DIAGNOSIS — I10 BENIGN ESSENTIAL HYPERTENSION: ICD-10-CM

## 2018-09-10 RX ORDER — DILTIAZEM HYDROCHLORIDE 240 MG/1
240 CAPSULE, EXTENDED RELEASE ORAL DAILY
Qty: 90 CAPSULE | Refills: 0 | Status: SHIPPED | OUTPATIENT
Start: 2018-09-10 | End: 2018-12-21 | Stop reason: SDUPTHER

## 2018-10-21 DIAGNOSIS — I10 BENIGN ESSENTIAL HYPERTENSION: ICD-10-CM

## 2018-10-22 RX ORDER — DILTIAZEM HYDROCHLORIDE 240 MG/1
240 CAPSULE, COATED, EXTENDED RELEASE ORAL DAILY
Qty: 30 CAPSULE | Refills: 0 | Status: SHIPPED | OUTPATIENT
Start: 2018-10-22 | End: 2018-11-19

## 2018-11-19 ENCOUNTER — OFFICE VISIT (OUTPATIENT)
Dept: FAMILY MEDICINE CLINIC | Facility: CLINIC | Age: 73
End: 2018-11-19

## 2018-11-19 VITALS
HEART RATE: 68 BPM | OXYGEN SATURATION: 99 % | HEIGHT: 62 IN | SYSTOLIC BLOOD PRESSURE: 132 MMHG | WEIGHT: 187 LBS | BODY MASS INDEX: 34.41 KG/M2 | DIASTOLIC BLOOD PRESSURE: 80 MMHG

## 2018-11-19 DIAGNOSIS — M79.604 PAIN IN BOTH LOWER EXTREMITIES: Primary | ICD-10-CM

## 2018-11-19 DIAGNOSIS — M62.838 MUSCLE SPASM: ICD-10-CM

## 2018-11-19 DIAGNOSIS — Z79.899 MEDICATION MANAGEMENT: ICD-10-CM

## 2018-11-19 DIAGNOSIS — I10 BENIGN ESSENTIAL HYPERTENSION: ICD-10-CM

## 2018-11-19 DIAGNOSIS — R53.1 WEAKNESS: ICD-10-CM

## 2018-11-19 DIAGNOSIS — M85.80 OTHER SPECIFIED DISORDERS OF BONE DENSITY AND STRUCTURE, UNSPECIFIED SITE: ICD-10-CM

## 2018-11-19 DIAGNOSIS — M79.605 PAIN IN BOTH LOWER EXTREMITIES: Primary | ICD-10-CM

## 2018-11-19 PROBLEM — Z12.11 SCREEN FOR COLON CANCER: Status: RESOLVED | Noted: 2018-03-05 | Resolved: 2018-11-19

## 2018-11-19 PROBLEM — Z12.11 SCREENING FOR COLON CANCER: Status: RESOLVED | Noted: 2018-02-07 | Resolved: 2018-11-19

## 2018-11-19 PROBLEM — M85.89 OSTEOPENIA OF MULTIPLE SITES: Status: ACTIVE | Noted: 2018-11-19

## 2018-11-19 PROCEDURE — 99214 OFFICE O/P EST MOD 30 MIN: CPT | Performed by: FAMILY MEDICINE

## 2018-11-19 RX ORDER — KETOCONAZOLE 20 MG/G
CREAM TOPICAL
Refills: 6 | COMMUNITY
Start: 2018-08-21

## 2018-11-19 RX ORDER — TRIAMCINOLONE ACETONIDE 1 MG/G
CREAM TOPICAL
Refills: 3 | COMMUNITY
Start: 2018-08-21

## 2018-11-19 NOTE — PROGRESS NOTES
Subjective    Chief Complaint   Patient presents with   • Leg Pain   • Hip Pain       Christine Quick is a 73 y.o. female.     History of Present Illness   Leg pain  New problem to me. Bilateral, started in the left but also involving the right. First time it happened was a year ago. Then it went away for the most part  Lately has been more consistently happening over the last 3 weeks. Hurting more often and worse. Happens with any activity or inactivity. She is taking tylenol and feels that does help ease the pain. She has also tried increasing her water intake and started taking potassium again she thinks 50 mg every other day because also in her MV. Her pineapple helps too. Pains will strike abruptly, lasts about 5-10 minutes. Doesn't last long time. Can help some with moving and shifting position, rubbing her legs. Mostly hurts on the back of her legs. No back pain. Feels like a nehemias horse, like a catch, feels really tight to the point she can't move until it lessens. Back of leg and into butt more recently. Feels like a tightness in her hamstring and up the back of the legs. Usually goes up the back of the leg but sometimes into the butt that is newer. Sometimes is just one side but can be one at a time or both, mostly left. Causing problem every day, several times per day. Feels like she gets weak when it happens, before and after has normal strength, has not fallen. No numbness, tingling or loss of sensation. Has hx of bad veins in her left leg.     HTN  Had reaction to diltiazem, multiple manufacturers were tried with her pharmacy and found one that works. On low dose of HCTZ, feels it is working well      The following portions of the patient's history were reviewed and updated as appropriate: allergies, current medications, past surgical history and problem list.    Review of Systems   Constitutional: Negative.    HENT: Negative.    Eyes: Negative.    Respiratory: Negative.    Cardiovascular: Positive for  leg swelling.   Gastrointestinal: Negative.    Endocrine: Negative.    Genitourinary: Negative.    Musculoskeletal: Negative.    Skin: Negative.    Neurological: Negative.    Hematological: Negative.    Psychiatric/Behavioral: Negative.        Objective    Vitals:    11/19/18 1603   BP: 132/80   Pulse: 68   SpO2: 99%       Physical Exam   Constitutional: She is oriented to person, place, and time. She appears well-nourished. No distress.   Eyes: Conjunctivae are normal. No scleral icterus.   Pulmonary/Chest: Effort normal. No respiratory distress.   Musculoskeletal: She exhibits edema. She exhibits no tenderness or deformity.   Ankle edema. The legs are non tender to palpation. Multiple times during the exam with getting down from the exam table, lying back on the exam table and with the UNIQUE bilaterally she developed tightening of the posterior thigh. No lumbar pain, negative straight leg. Symptoms limited to posterior thigh.   Neurological: She is alert and oriented to person, place, and time.   Skin: Skin is warm and dry. No erythema.   Small varicosities throughout bilateral LE large number posterior knee, distally. These are non tender to palpation.   Psychiatric: She has a normal mood and affect. Her behavior is normal.   Vitals reviewed.      Assessment/Plan   Christine was seen today for leg pain and hip pain.    Diagnoses and all orders for this visit:    Pain in both lower extremities  -     Basic Metabolic Panel  -     TSH Rfx On Abnormal To Free T4  -     Vitamin D 25 hydroxy  -     Magnesium  With symptoms and exam seem muscular. Lack of flexibility. Considered sciatica but pain originates in the posterior thigh, tightening. Recommended muscle relaxer prn, PT. Pt would like to try things at home. Discussed stretches, exercises to do and to walk for exercise. Can do heat and massage. We will check labs as well.   Benign essential hypertension  -     Basic Metabolic Panel  Good control. Continue meds as is.    Medication management  -     Basic Metabolic Panel  -     Vitamin B12  -     Vitamin D 25 hydroxy  Taking potassium, lots of vitamins.  Muscle spasm  -     TSH Rfx On Abnormal To Free T4  -     Magnesium  Will hold on muscle relaxer because of concern for being sedating. Most of her problems are during the day when she is working and driving, when she gets home at night she can relax and stretch which helps.   Weakness  -     TSH Rfx On Abnormal To Free T4  -     Vitamin D 25 hydroxy  Only when she is having the pain and tightening, otherwise normal strength.  Other specified disorders of bone density and structure, unspecified site   -     Vitamin D 25 hydroxy  Pt with osteopenia and supplementing.

## 2018-12-11 RX ORDER — HYDROCHLOROTHIAZIDE 25 MG/1
25 TABLET ORAL DAILY
Qty: 30 TABLET | Refills: 0 | OUTPATIENT
Start: 2018-12-11

## 2018-12-11 RX ORDER — HYDROCHLOROTHIAZIDE 12.5 MG/1
12.5 TABLET ORAL DAILY
Qty: 90 TABLET | Refills: 1 | Status: SHIPPED | OUTPATIENT
Start: 2018-12-11 | End: 2019-07-18 | Stop reason: SDUPTHER

## 2018-12-21 DIAGNOSIS — I10 BENIGN ESSENTIAL HYPERTENSION: ICD-10-CM

## 2018-12-21 RX ORDER — DILTIAZEM HYDROCHLORIDE 240 MG/1
240 CAPSULE, EXTENDED RELEASE ORAL DAILY
Qty: 90 CAPSULE | Refills: 0 | Status: SHIPPED | OUTPATIENT
Start: 2018-12-21 | End: 2019-03-23 | Stop reason: SDUPTHER

## 2019-03-23 DIAGNOSIS — I10 BENIGN ESSENTIAL HYPERTENSION: ICD-10-CM

## 2019-03-25 RX ORDER — DILTIAZEM HYDROCHLORIDE 240 MG/1
240 CAPSULE, EXTENDED RELEASE ORAL DAILY
Qty: 90 CAPSULE | Refills: 0 | Status: SHIPPED | OUTPATIENT
Start: 2019-03-25 | End: 2019-07-30 | Stop reason: SDUPTHER

## 2019-07-18 DIAGNOSIS — Z79.899 MEDICATION MANAGEMENT: ICD-10-CM

## 2019-07-18 DIAGNOSIS — R53.1 WEAKNESS: ICD-10-CM

## 2019-07-18 DIAGNOSIS — E55.9 VITAMIN D DEFICIENCY: ICD-10-CM

## 2019-07-18 DIAGNOSIS — I10 BENIGN ESSENTIAL HYPERTENSION: Primary | ICD-10-CM

## 2019-07-19 RX ORDER — HYDROCHLOROTHIAZIDE 12.5 MG/1
12.5 TABLET ORAL DAILY
Qty: 90 TABLET | Refills: 0 | Status: SHIPPED | OUTPATIENT
Start: 2019-07-19 | End: 2019-11-01 | Stop reason: SDUPTHER

## 2019-07-23 ENCOUNTER — RESULTS ENCOUNTER (OUTPATIENT)
Dept: FAMILY MEDICINE CLINIC | Facility: CLINIC | Age: 74
End: 2019-07-23

## 2019-07-23 DIAGNOSIS — R53.1 WEAKNESS: ICD-10-CM

## 2019-07-23 DIAGNOSIS — E55.9 VITAMIN D DEFICIENCY: ICD-10-CM

## 2019-07-23 DIAGNOSIS — Z79.899 MEDICATION MANAGEMENT: ICD-10-CM

## 2019-07-23 DIAGNOSIS — I10 BENIGN ESSENTIAL HYPERTENSION: ICD-10-CM

## 2019-07-30 DIAGNOSIS — I10 BENIGN ESSENTIAL HYPERTENSION: ICD-10-CM

## 2019-07-30 RX ORDER — DILTIAZEM HYDROCHLORIDE 240 MG/1
240 CAPSULE, EXTENDED RELEASE ORAL DAILY
Qty: 90 CAPSULE | Refills: 0 | Status: SHIPPED | OUTPATIENT
Start: 2019-07-30 | End: 2019-11-01 | Stop reason: SDUPTHER

## 2019-08-22 LAB
25(OH)D3+25(OH)D2 SERPL-MCNC: 57.8 NG/ML (ref 30–100)
BUN SERPL-MCNC: 19 MG/DL (ref 8–23)
BUN/CREAT SERPL: 27.1 (ref 7–25)
CALCIUM SERPL-MCNC: 9 MG/DL (ref 8.6–10.5)
CHLORIDE SERPL-SCNC: 103 MMOL/L (ref 98–107)
CO2 SERPL-SCNC: 28.9 MMOL/L (ref 22–29)
CREAT SERPL-MCNC: 0.7 MG/DL (ref 0.57–1)
GLUCOSE SERPL-MCNC: 90 MG/DL (ref 65–99)
MAGNESIUM SERPL-MCNC: 2.1 MG/DL (ref 1.6–2.4)
POTASSIUM SERPL-SCNC: 3.8 MMOL/L (ref 3.5–5.2)
SODIUM SERPL-SCNC: 144 MMOL/L (ref 136–145)
VIT B12 SERPL-MCNC: 938 PG/ML (ref 211–946)

## 2019-10-14 ENCOUNTER — TELEPHONE (OUTPATIENT)
Dept: FAMILY MEDICINE CLINIC | Facility: CLINIC | Age: 74
End: 2019-10-14

## 2019-10-14 NOTE — TELEPHONE ENCOUNTER
Pt contacted office stating that her opthalmologist placed her on valacyclovir to take when she is having flare ups due to her having it in her eye and patient having cold sores. Patient is wanting approval that it is safe to take with her other medications and along with the methotrexate that her dermaogist prescribed for her to take weekly for her psoriasis. Pt states will not start either medication until Yoly states that it is safe. Please advise.

## 2019-10-17 ENCOUNTER — TELEPHONE (OUTPATIENT)
Dept: FAMILY MEDICINE CLINIC | Facility: CLINIC | Age: 74
End: 2019-10-17

## 2019-10-17 DIAGNOSIS — Z79.899 MEDICATION MANAGEMENT: Primary | ICD-10-CM

## 2019-10-17 NOTE — TELEPHONE ENCOUNTER
Spoke with pt about her medication questions. She is seeing dermatology for psoriatic arthritis. Says the methotrexate is helping with her symptoms. She takes weekly. She also has chronic occular right herpes infection. Follow up at derm with herpetic lesion on her mouth and Dr. Rosenthal her derm recommended she take valacyclovir for this, then Dr. Hinkle with ophtho cornea specialist recommended the same. She was concerned to do this. I told her ok to do a trial. She is going to take and then come in that same week for checking her liver labs which she says were changed with derm about 2-3 months ago. She is agreeable to this.

## 2019-10-22 ENCOUNTER — RESULTS ENCOUNTER (OUTPATIENT)
Dept: FAMILY MEDICINE CLINIC | Facility: CLINIC | Age: 74
End: 2019-10-22

## 2019-10-22 DIAGNOSIS — Z79.899 MEDICATION MANAGEMENT: ICD-10-CM

## 2019-11-01 DIAGNOSIS — I10 BENIGN ESSENTIAL HYPERTENSION: ICD-10-CM

## 2019-11-01 RX ORDER — DILTIAZEM HYDROCHLORIDE 240 MG/1
240 CAPSULE, EXTENDED RELEASE ORAL DAILY
Qty: 90 CAPSULE | Refills: 0 | Status: SHIPPED | OUTPATIENT
Start: 2019-11-01 | End: 2020-02-25 | Stop reason: SDUPTHER

## 2019-11-01 RX ORDER — HYDROCHLOROTHIAZIDE 12.5 MG/1
12.5 TABLET ORAL DAILY
Qty: 90 TABLET | Refills: 0 | Status: SHIPPED | OUTPATIENT
Start: 2019-11-01 | End: 2020-02-25 | Stop reason: SDUPTHER

## 2020-02-08 DIAGNOSIS — I10 BENIGN ESSENTIAL HYPERTENSION: ICD-10-CM

## 2020-02-10 RX ORDER — DILTIAZEM HYDROCHLORIDE 240 MG/1
240 CAPSULE, EXTENDED RELEASE ORAL DAILY
Qty: 90 CAPSULE | Refills: 0 | OUTPATIENT
Start: 2020-02-10

## 2020-02-17 ENCOUNTER — APPOINTMENT (OUTPATIENT)
Dept: GENERAL RADIOLOGY | Facility: HOSPITAL | Age: 75
End: 2020-02-17

## 2020-02-17 PROCEDURE — 73610 X-RAY EXAM OF ANKLE: CPT | Performed by: GENERAL PRACTICE

## 2020-02-17 RX ORDER — HYDROCHLOROTHIAZIDE 12.5 MG/1
12.5 TABLET ORAL DAILY
Qty: 90 TABLET | Refills: 0 | OUTPATIENT
Start: 2020-02-17

## 2020-02-25 ENCOUNTER — TELEPHONE (OUTPATIENT)
Dept: FAMILY MEDICINE CLINIC | Facility: CLINIC | Age: 75
End: 2020-02-25

## 2020-02-25 DIAGNOSIS — I10 BENIGN ESSENTIAL HYPERTENSION: ICD-10-CM

## 2020-02-25 RX ORDER — DILTIAZEM HYDROCHLORIDE 240 MG/1
240 CAPSULE, EXTENDED RELEASE ORAL DAILY
Qty: 3 CAPSULE | Refills: 0 | Status: SHIPPED | OUTPATIENT
Start: 2020-02-25 | End: 2020-02-26

## 2020-02-25 RX ORDER — HYDROCHLOROTHIAZIDE 12.5 MG/1
12.5 TABLET ORAL DAILY
Qty: 3 TABLET | Refills: 0 | Status: SHIPPED | OUTPATIENT
Start: 2020-02-25 | End: 2020-02-26

## 2020-02-25 NOTE — TELEPHONE ENCOUNTER
Pt went to Urgent Care a couple weeks ago regarding her ankles swelling from the water pill.  doctor switched her water pill to Methylprednisolone. This has helped her and taken the swelling completely away. She wants to know if Dr. Frederick is ok with switching her medication and sending this into [facility].

## 2020-02-26 DIAGNOSIS — I10 BENIGN ESSENTIAL HYPERTENSION: ICD-10-CM

## 2020-02-26 RX ORDER — DILTIAZEM HYDROCHLORIDE 240 MG/1
CAPSULE, EXTENDED RELEASE ORAL
Qty: 3 CAPSULE | Refills: 0 | Status: SHIPPED | OUTPATIENT
Start: 2020-02-26 | End: 2020-03-09

## 2020-02-26 RX ORDER — HYDROCHLOROTHIAZIDE 12.5 MG/1
TABLET ORAL
Qty: 3 TABLET | Refills: 0 | Status: SHIPPED | OUTPATIENT
Start: 2020-02-26 | End: 2020-02-28 | Stop reason: ALTCHOICE

## 2020-02-27 DIAGNOSIS — I10 BENIGN ESSENTIAL HYPERTENSION: Primary | ICD-10-CM

## 2020-02-27 DIAGNOSIS — Z79.899 MEDICATION MANAGEMENT: ICD-10-CM

## 2020-02-28 ENCOUNTER — OFFICE VISIT (OUTPATIENT)
Dept: FAMILY MEDICINE CLINIC | Facility: CLINIC | Age: 75
End: 2020-02-28

## 2020-02-28 VITALS
TEMPERATURE: 98.4 F | OXYGEN SATURATION: 96 % | HEART RATE: 62 BPM | SYSTOLIC BLOOD PRESSURE: 128 MMHG | BODY MASS INDEX: 34.63 KG/M2 | WEIGHT: 188.2 LBS | HEIGHT: 62 IN | DIASTOLIC BLOOD PRESSURE: 78 MMHG | RESPIRATION RATE: 13 BRPM

## 2020-02-28 DIAGNOSIS — J30.9 ALLERGIC RHINITIS, UNSPECIFIED SEASONALITY, UNSPECIFIED TRIGGER: ICD-10-CM

## 2020-02-28 DIAGNOSIS — I10 BENIGN ESSENTIAL HYPERTENSION: Primary | ICD-10-CM

## 2020-02-28 DIAGNOSIS — Z79.899 MEDICATION MANAGEMENT: ICD-10-CM

## 2020-02-28 DIAGNOSIS — M79.89 LEFT LEG SWELLING: ICD-10-CM

## 2020-02-28 DIAGNOSIS — E78.89 ELEVATED HDL: ICD-10-CM

## 2020-02-28 PROBLEM — L40.50 PSORIATIC ARTHRITIS: Status: ACTIVE | Noted: 2020-02-28

## 2020-02-28 PROBLEM — H54.40 BLINDNESS OF RIGHT EYE WITH NORMAL VISION IN CONTRALATERAL EYE: Status: ACTIVE | Noted: 2020-02-28

## 2020-02-28 PROBLEM — L40.9 PSORIASIS: Status: ACTIVE | Noted: 2020-02-28

## 2020-02-28 PROCEDURE — 99214 OFFICE O/P EST MOD 30 MIN: CPT | Performed by: FAMILY MEDICINE

## 2020-02-28 RX ORDER — FOLIC ACID 1 MG/1
1 TABLET ORAL DAILY
COMMUNITY

## 2020-02-28 RX ORDER — FUROSEMIDE 20 MG/1
20 TABLET ORAL DAILY
Qty: 30 TABLET | Refills: 0 | Status: SHIPPED | OUTPATIENT
Start: 2020-02-28 | End: 2020-03-26

## 2020-02-28 RX ORDER — FLUTICASONE PROPIONATE 50 MCG
2 SPRAY, SUSPENSION (ML) NASAL DAILY
COMMUNITY
End: 2021-08-31

## 2020-02-28 NOTE — PROGRESS NOTES
"The ABCs of the Annual Wellness Visit  Subsequent Medicare Wellness Visit    Chief Complaint   Patient presents with   • Medicare Wellness-subsequent       Subjective   History of Present Illness:  Christine Quick is a 74 y.o. female who presents for a Subsequent Medicare Wellness Visit.    HEALTH RISK ASSESSMENT    Recent Hospitalizations:  {Hospitalization history:[id number]::\"No hospitalization(s) within the last year.\"}    Current Medical Providers:  Patient Care Team:  Tamiko Frederick MD as PCP - General (Family Medicine)  Jaren Rosenthal MD as PCP - Claims Attributed    Smoking Status:  Social History     Tobacco Use   Smoking Status Never Smoker   Smokeless Tobacco Never Used       Alcohol Consumption:  Social History     Substance and Sexual Activity   Alcohol Use Yes   • Alcohol/week: 1.0 standard drinks   • Types: 1 Glasses of wine per week    Comment: special occasions       Depression Screen:   PHQ-2/PHQ-9 Depression Screening 2/28/2020   Little interest or pleasure in doing things 0   Feeling down, depressed, or hopeless 0   Total Score 0       Fall Risk Screen:  STEADI Fall Risk Assessment was completed, and patient is at LOW risk for falls.Assessment completed on:2/28/2020    Health Habits and Functional and Cognitive Screening:  Functional & Cognitive Status 2/28/2020   Do you have difficulty preparing food and eating? No   Do you have difficulty bathing yourself, getting dressed or grooming yourself? No   Do you have difficulty using the toilet? No   Do you have difficulty moving around from place to place? No   Do you have trouble with steps or getting out of a bed or a chair? No   Current Diet Well Balanced Diet   Dental Exam Not up to date   Eye Exam Up to date   Exercise (times per week) 7 times per week   Current Exercise Activities Include Walking   Do you need help using the phone?  No   Are you deaf or do you have serious difficulty hearing?  No   Do you need help with transportation? No " "  Do you need help shopping? No   Do you need help preparing meals?  No   Do you need help with housework?  No   Do you need help with laundry? No   Do you need help taking your medications? No   Do you need help managing money? No   Do you ever drive or ride in a car without wearing a seat belt? No   Have you felt unusual stress, anger or loneliness in the last month? No   Who do you live with? Spouse   If you need help, do you have trouble finding someone available to you? No   Have you been bothered in the last four weeks by sexual problems? No   Do you have difficulty concentrating, remembering or making decisions? No         Does the patient have evidence of cognitive impairment? {Yes/No w/ pre-defaulted No:47583::\"No\"}    Asprin use counseling:{Aspirin :59159}    Age-appropriate Screening Schedule:  Refer to the list below for future screening recommendations based on patient's age, sex and/or medical conditions. Orders for these recommended tests are listed in the plan section. The patient has been provided with a written plan.    Health Maintenance   Topic Date Due   • TDAP/TD VACCINES (1 - Tdap) 08/13/1955   • ZOSTER VACCINE (1 of 2) 08/13/1994   • DXA SCAN  11/30/2015   • MAMMOGRAM  03/16/2017   • INFLUENZA VACCINE  08/01/2019   • COLONOSCOPY  Discontinued          The following portions of the patient's history were reviewed and updated as appropriate: {history reviewed:20406::\"allergies\",\"current medications\",\"past family history\",\"past medical history\",\"past social history\",\"past surgical history\",\"problem list\"}.    Outpatient Medications Prior to Visit   Medication Sig Dispense Refill   • calcium carbonate-vitamin d 600-400 MG-UNIT per tablet Take by mouth.     • Cholecalciferol (VITAMIN D3) 3000 UNITS tablet Take by mouth.     • desonide (DESOWEN) 0.05 % cream APPLY TOPICALLY BID SPARINGLY AND RUB GENTLY INTO THE AFFECTED AREAS  2   • dilTIAZem (TIAZAC) 240 MG 24 hr capsule TAKE ONE CAPSULE BY " "MOUTH EVERY DAY; LAST REFILL- NEEDS TO BE SEEN 3 capsule 0   • fexofenadine (ALLEGRA) 60 MG tablet Take by mouth.     • Flaxseed, Linseed, (FLAXSEED OIL) 1200 MG capsule Take by mouth.     • hydroCHLOROthiazide (HYDRODIURIL) 12.5 MG tablet TAKE 1 TABLET BY MOUTH EVERY DAY; LAST REFILL- NEEDS TO BE SEEN 3 tablet 0   • ketoconazole (NIZORAL) 2 % cream   6   • methotrexate 2.5 MG tablet TK 5 TS PO ONE DAY A WEEK UTD     • Multiple Vitamin (MULTI VITAMIN DAILY PO) Take by mouth.     • prednisoLONE acetate (PRED FORTE) 1 % ophthalmic suspension   0   • triamcinolone (KENALOG) 0.1 % cream   3   • B Complex-C (B-COMPLEX WITH VITAMIN C) tablet Take by mouth.     • methylPREDNISolone (MEDROL, AUDREY,) 4 MG tablet Take as directed on package instructions. 21 tablet 0     No facility-administered medications prior to visit.        Patient Active Problem List   Diagnosis   • Anxiety   • Benign essential hypertension   • Impaired fasting glucose   • Shoulder pain   • Obesity (BMI 30-39.9)   • Urge incontinence of urine   • Osteopenia of multiple sites       Advanced Care Planning:  {Advanced Directive Status:16335::\"ACP discussion was held with the patient during this visit.\"}    Review of Systems   Respiratory: Negative.    Cardiovascular: Positive for leg swelling.   Neurological: Negative.        Compared to one year ago, the patient feels her physical health is {better worse same:76874}.  Compared to one year ago, the patient feels her mental health is {better worse same:27753}.    Reviewed chart for potential of high risk medication in the elderly: {Response;Yes/No/NA:[id number]::\"yes\"}  Reviewed chart for potential of harmful drug interactions in the elderly:{Response;Yes/No/NA:[id number]::\"yes\"}    Objective         Vitals:    02/28/20 0917   BP: 128/78   BP Location: Left arm   Patient Position: Sitting   Cuff Size: Adult   Pulse: 62   Resp: 13   Temp: 98.4 °F (36.9 °C)   TempSrc: Oral   SpO2: 96%   Weight: 85.4 kg (188 lb " "3.2 oz)   Height: 157.5 cm (62\")   PainSc:   1   PainLoc: Ear       Body mass index is 34.42 kg/m².  Discussed the patient's BMI with her. The BMI {BMI plan ([facility] measure 421):91236}.    Physical Exam          Assessment/Plan   Medicare Risks and Personalized Health Plan  CMS Preventative Services Quick Reference  {Medicare Wellness Risk Factors and Personalized Health Plan:49415}    The above risks/problems have been discussed with the patient.  Pertinent information has been shared with the patient in the After Visit Summary.  Follow up plans and orders are seen below in the Assessment/Plan Section.    There are no diagnoses linked to this encounter.  Follow Up:  No follow-ups on file.     An After Visit Summary and PPPS were given to the patient.               "

## 2020-02-29 LAB
ALBUMIN SERPL-MCNC: 4.1 G/DL (ref 3.5–5.2)
ALBUMIN/GLOB SERPL: 1.8 G/DL
ALP SERPL-CCNC: 84 U/L (ref 39–117)
ALT SERPL-CCNC: 23 U/L (ref 1–33)
AST SERPL-CCNC: 20 U/L (ref 1–32)
BASOPHILS # BLD AUTO: 0.04 10*3/MM3 (ref 0–0.2)
BASOPHILS NFR BLD AUTO: 1.1 % (ref 0–1.5)
BILIRUB SERPL-MCNC: 0.4 MG/DL (ref 0.2–1.2)
BUN SERPL-MCNC: 13 MG/DL (ref 8–23)
BUN/CREAT SERPL: 18.1 (ref 7–25)
CALCIUM SERPL-MCNC: 9 MG/DL (ref 8.6–10.5)
CHLORIDE SERPL-SCNC: 102 MMOL/L (ref 98–107)
CHOLEST SERPL-MCNC: 187 MG/DL (ref 0–200)
CO2 SERPL-SCNC: 30.2 MMOL/L (ref 22–29)
CREAT SERPL-MCNC: 0.72 MG/DL (ref 0.57–1)
EOSINOPHIL # BLD AUTO: 0.14 10*3/MM3 (ref 0–0.4)
EOSINOPHIL NFR BLD AUTO: 3.8 % (ref 0.3–6.2)
ERYTHROCYTE [DISTWIDTH] IN BLOOD BY AUTOMATED COUNT: 12.4 % (ref 12.3–15.4)
GLOBULIN SER CALC-MCNC: 2.3 GM/DL
GLUCOSE SERPL-MCNC: 90 MG/DL (ref 65–99)
HCT VFR BLD AUTO: 40.9 % (ref 34–46.6)
HDLC SERPL-MCNC: 88 MG/DL (ref 40–60)
HGB BLD-MCNC: 13.4 G/DL (ref 12–15.9)
IMM GRANULOCYTES # BLD AUTO: 0.01 10*3/MM3 (ref 0–0.05)
IMM GRANULOCYTES NFR BLD AUTO: 0.3 % (ref 0–0.5)
LDLC SERPL CALC-MCNC: 85 MG/DL (ref 0–100)
LYMPHOCYTES # BLD AUTO: 1.07 10*3/MM3 (ref 0.7–3.1)
LYMPHOCYTES NFR BLD AUTO: 29.1 % (ref 19.6–45.3)
MCH RBC QN AUTO: 32.1 PG (ref 26.6–33)
MCHC RBC AUTO-ENTMCNC: 32.8 G/DL (ref 31.5–35.7)
MCV RBC AUTO: 98.1 FL (ref 79–97)
MONOCYTES # BLD AUTO: 0.58 10*3/MM3 (ref 0.1–0.9)
MONOCYTES NFR BLD AUTO: 15.8 % (ref 5–12)
NEUTROPHILS # BLD AUTO: 1.84 10*3/MM3 (ref 1.7–7)
NEUTROPHILS NFR BLD AUTO: 49.9 % (ref 42.7–76)
NRBC BLD AUTO-RTO: 0 /100 WBC (ref 0–0.2)
PLATELET # BLD AUTO: 157 10*3/MM3 (ref 140–450)
POTASSIUM SERPL-SCNC: 3.8 MMOL/L (ref 3.5–5.2)
PROT SERPL-MCNC: 6.4 G/DL (ref 6–8.5)
RBC # BLD AUTO: 4.17 10*6/MM3 (ref 3.77–5.28)
SODIUM SERPL-SCNC: 142 MMOL/L (ref 136–145)
TRIGL SERPL-MCNC: 70 MG/DL (ref 0–150)
VLDLC SERPL CALC-MCNC: 14 MG/DL
WBC # BLD AUTO: 3.68 10*3/MM3 (ref 3.4–10.8)

## 2020-03-07 DIAGNOSIS — I10 BENIGN ESSENTIAL HYPERTENSION: ICD-10-CM

## 2020-03-08 DIAGNOSIS — I10 BENIGN ESSENTIAL HYPERTENSION: ICD-10-CM

## 2020-03-09 RX ORDER — DILTIAZEM HYDROCHLORIDE 240 MG/1
CAPSULE, EXTENDED RELEASE ORAL
Qty: 3 CAPSULE | Refills: 0 | OUTPATIENT
Start: 2020-03-09

## 2020-03-09 RX ORDER — HYDROCHLOROTHIAZIDE 12.5 MG/1
TABLET ORAL
Qty: 3 TABLET | Refills: 0 | OUTPATIENT
Start: 2020-03-09

## 2020-03-09 RX ORDER — DILTIAZEM HYDROCHLORIDE 240 MG/1
CAPSULE, EXTENDED RELEASE ORAL
Qty: 90 CAPSULE | Refills: 1 | Status: SHIPPED | OUTPATIENT
Start: 2020-03-09 | End: 2020-09-15

## 2020-03-26 RX ORDER — FUROSEMIDE 20 MG/1
20 TABLET ORAL DAILY
Qty: 30 TABLET | Refills: 0 | Status: SHIPPED | OUTPATIENT
Start: 2020-03-26 | End: 2020-04-29

## 2020-04-29 RX ORDER — FUROSEMIDE 20 MG/1
20 TABLET ORAL DAILY
Qty: 90 TABLET | Refills: 0 | Status: SHIPPED | OUTPATIENT
Start: 2020-04-29 | End: 2020-08-04

## 2020-07-22 ENCOUNTER — TELEPHONE (OUTPATIENT)
Dept: FAMILY MEDICINE CLINIC | Facility: CLINIC | Age: 75
End: 2020-07-22

## 2020-07-22 NOTE — TELEPHONE ENCOUNTER
Message Summary    Patient called and requests a return call and potential prescription for some issues she is having with a shingle like rash that she believes stems from issues with her verucose veins.  Please  return call and advise.    Best Callback Number: [phone] (Home)    Patient Notes: n/a

## 2020-07-22 NOTE — TELEPHONE ENCOUNTER
Spoke to patient and she was evaluated by her dermatologist on 7/21/2020 and he ruled out shingles and said that they were insect bites and prescribed fluconazole for her and also advised patient to use her ketoconazole and Kenalog creams on the area as well.  She is concerned because the areas (which are mainly behind her knee) are now oozing and is concerned about infection. She also states that they almost look like blisters.  I advised her that you were out of the office today, but that I would pass this information on to you.  Explained that likely, an appt would need to be made so this could be evaluated.  She expressed understanding.  Please advise.    Patient requested that we call her cell phone back at [phone]

## 2020-07-23 ENCOUNTER — OFFICE VISIT (OUTPATIENT)
Dept: FAMILY MEDICINE CLINIC | Facility: CLINIC | Age: 75
End: 2020-07-23

## 2020-07-23 VITALS
SYSTOLIC BLOOD PRESSURE: 142 MMHG | WEIGHT: 193.1 LBS | BODY MASS INDEX: 35.53 KG/M2 | TEMPERATURE: 97.9 F | HEART RATE: 74 BPM | DIASTOLIC BLOOD PRESSURE: 76 MMHG | HEIGHT: 62 IN | OXYGEN SATURATION: 98 %

## 2020-07-23 DIAGNOSIS — R21 RASH AND NONSPECIFIC SKIN ERUPTION: Primary | ICD-10-CM

## 2020-07-23 PROCEDURE — 99214 OFFICE O/P EST MOD 30 MIN: CPT | Performed by: NURSE PRACTITIONER

## 2020-07-23 NOTE — PROGRESS NOTES
Subjective   Christine Quick is a 74 y.o. female.     Chief Complaint   Patient presents with   • Rash     x 5 days, behind both knees, on the outer calf of her left leg and her left hip and by her navel. Derm thought it was insect bites but she is unsure if it could be that or her varicose veins, the blisters secrete clear liquids      HPI this is a new patient to me.  She is here for a rash that she recently went to the dermatologist for.  The dermatologist diagnosed her with bug bites and she is on fluconazole as well as her normal topicals for psoriasis.  She feels that the rash is improving today.  She has noticed that they open up and weep and she was concerned that it could be due to her vasculature.  She does have some chronic left leg swelling that is not very well controlled with her Lasix.    She also has rheumatoid arthritis of her left ankle and wanted to discuss management for that.  She takes methotrexate weekly but does not know if it is very helpful.  There are days when she is not able to walk much and she likes to be active.  She is wondering if there are other options for care.  She also wonders if there are options that could be more natural        Social History     Tobacco Use   • Smoking status: Never Smoker   • Smokeless tobacco: Never Used   Substance Use Topics   • Alcohol use: Yes     Alcohol/week: 1.0 standard drinks     Types: 1 Glasses of wine per week     Comment: special occasions   • Drug use: No       The following portions of the patient's history were reviewed and updated as appropriate: allergies, current medications, past family history, past medical history, past social history, past surgical history and problem list.    Review of Systems   Constitutional: Positive for activity change and fatigue. Negative for appetite change and unexpected weight change.   HENT: Negative for congestion, rhinorrhea and sore throat.    Eyes: Negative for redness and itching.   Respiratory:  "Negative for cough, chest tightness, shortness of breath and wheezing.    Cardiovascular: Positive for leg swelling. Negative for chest pain and palpitations.   Gastrointestinal: Negative for abdominal pain, blood in stool, constipation, diarrhea, nausea and vomiting.   Musculoskeletal: Positive for arthralgias and gait problem.   Skin: Positive for rash.   Neurological: Negative for dizziness, weakness and headaches.       Objective   Blood pressure 142/76, pulse 74, temperature 97.9 °F (36.6 °C), temperature source Tympanic, height 157.5 cm (62\"), weight 87.6 kg (193 lb 1.6 oz), SpO2 98 %, not currently breastfeeding.  Body mass index is 35.32 kg/m².    Physical Exam   Constitutional: She is oriented to person, place, and time. She appears well-developed and well-nourished. No distress.   HENT:   Head: Normocephalic and atraumatic.   Eyes: Conjunctivae are normal. Right eye exhibits no discharge. Left eye exhibits no discharge.   Cardiovascular: Normal rate and regular rhythm.   Pulmonary/Chest: Effort normal and breath sounds normal.   Abdominal: Soft. Bowel sounds are normal. There is no tenderness.   Musculoskeletal: She exhibits no deformity.   Left ankle brace intact with left leg and ankle swelling.  Tough to tell how much of it is due to her ankle and how much is due to chronic leg swelling.    Gait smooth and steady   Neurological: She is alert and oriented to person, place, and time.   Skin: Skin is warm and dry. She is not diaphoretic.   4-5 punctate blister type lesions noted scattered on her legs and abdomen.  They feel somewhat firm and appear to be filled with clear fluid.   Psychiatric: She has a normal mood and affect.   Nursing note and vitals reviewed.      Assessment   Problem List Items Addressed This Visit     None      Visit Diagnoses     Rash and nonspecific skin eruption    -  Primary           Procedures           Impression and Plan: Rash: I am not sure what is causing her rash.  I think " it is unlikely to be bug bites.  Due to the vesicular appearance it could be some sort of viral illness.  I also do not understand why she was given the fluconazole although it does seem to be helping the wounds.  Will continue current plan per dermatology.  I have requested the note but have not been able to receive it.  Her psoriasis seems to be pretty well controlled.  We spent the majority of the visit discussing options for rheumatoid control including various natural medications such as CBD oil which she has tried and found helpful.  We also discussed tumuric and dietary modifications.  We also discussed other medications as such as immediate immunomodulators and the side effects of these.  After long discussion she feels that she would like a referral to rheumatology.  She thinks her daughter has one specifically in mind but not sure who this is so she will let me know.  I do not see any labs in her chart for rheumatology work-up.      Health Maintenance Due   Topic Date Due   • TDAP/TD VACCINES (1 - Tdap) 08/13/1956   • ZOSTER VACCINE (1 of 2) 08/13/1994   • Pneumococcal Vaccine Once at 65 Years Old  08/13/2009   • DXA SCAN  11/30/2015   • HEPATITIS C SCREENING  07/12/2016   • MAMMOGRAM  03/16/2017   • MEDICARE ANNUAL WELLNESS  06/28/2019              EMR Dragon/Transcription disclaimer:   Much of this encounter note is an electronic transcription/translation of spoken language to printed text. The electronic translation of spoken language may permit erroneous, or at times, nonsensical words or phrases to be inadvertently transcribed; Although I have reviewed the note for such errors, some may still exist.      The total time spent  was more than 25 min of which greater than 15 min of time (greater than 50% of the total time)  was spent face to face with the patient counseling on recommended evaluation and treatment options, instructions for management/treatment and /or follow up and importance of compliance  with chosen management or treatment options    In preparation for this visit I have reviewed patients most recent labs, recent imaging, last PCP note and consult note.

## 2020-07-23 NOTE — PATIENT INSTRUCTIONS
Low-FODMAP Eating Plan    FODMAPs (fermentable oligosaccharides, disaccharides, monosaccharides, and polyols) are sugars that are hard for some people to digest. A low-FODMAP eating plan may help some people who have bowel (intestinal) diseases to manage their symptoms.  This meal plan can be complicated to follow. Work with a diet and nutrition specialist (dietitian) to make a low-FODMAP eating plan that is right for you. A dietitian can make sure that you get enough nutrition from this diet.  What are tips for following this plan?  Reading food labels  · Check labels for hidden FODMAPs such as:  ? High-fructose syrup.  ? Honey.  ? Agave.  ? Natural fruit flavors.  ? Onion or garlic powder.  · Choose low-FODMAP foods that contain 3-4 grams of fiber per serving.  · Check food labels for serving sizes. Eat only one serving at a time to make sure FODMAP levels stay low.  Meal planning  · Follow a low-FODMAP eating plan for up to 6 weeks, or as told by your health care provider or dietitian.  · To follow the eating plan:  1. Eliminate high-FODMAP foods from your diet completely.  2. Gradually reintroduce high-FODMAP foods into your diet one at a time. Most people should wait a few days after introducing one high-FODMAP food before they introduce the next high-FODMAP food. Your dietitian can recommend how quickly you may reintroduce foods.  3. Keep a daily record of what you eat and drink, and make note of any symptoms that you have after eating.  4. Review your daily record with a dietitian regularly. Your dietitian can help you identify which foods you can eat and which foods you should avoid.  General tips  · Drink enough fluid each day to keep your urine pale yellow.  · Avoid processed foods. These often have added sugar and may be high in FODMAPs.  · Avoid most dairy products, whole grains, and sweeteners.  · Work with a dietitian to make sure you get enough fiber in your diet.  Recommended  "foods  Grains  · Gluten-free grains, such as rice, oats, buckwheat, quinoa, corn, polenta, and millet. Gluten-free pasta, bread, or cereal. Rice noodles. Corn tortillas.  Vegetables  · Eggplant, zucchini, cucumber, peppers, green beans, [place] sprouts, bean sprouts, lettuce, arugula, kale, Swiss chard, spinach, rody greens, bok salo, summer squash, potato, and tomato. Limited amounts of corn, carrot, and sweet potato. Green parts of scallions.  Fruits  · Bananas, oranges, [place], limes, blueberries, raspberries, strawberries, grapes, cantaloupe, honeydew melon, kiwi, papaya, passion fruit, and pineapple. Limited amounts of dried cranberries, banana chips, and shredded coconut.  Dairy  · Lactose-free milk, yogurt, and kefir. Lactose-free cottage cheese and ice cream. Non-dairy milks, such as almond, coconut, hemp, and rice milk. Yogurts made of non-dairy milks. Limited amounts of goat cheese, brie, mozzarella, parmesan, swiss, and other hard cheeses.  Meats and other protein foods  · Unseasoned beef, pork, poultry, or fish. Eggs. [place]. Tofu (firm) and tempeh. Limited amounts of nuts and seeds, such as almonds, walnuts, brazil nuts, pecans, peanuts, pumpkin seeds, codey seeds, and sunflower seeds.  Fats and oils  · Butter-free spreads. Vegetable oils, such as olive, canola, and sunflower oil.  Seasoning and other foods  · Artificial sweeteners with names that do not end in \"ol\" such as aspartame, saccharine, and stevia. Maple syrup, white table sugar, raw sugar, brown sugar, and molasses. Fresh basil, coriander, parsley, rosemary, and thyme.  Beverages  · Water and mineral water. Sugar-sweetened soft drinks. Small amounts of orange juice or cranberry juice. Black and green tea. Most dry ellie. Coffee.  This may not be a complete list of low-FODMAP foods. Talk with your dietitian for more information.  Foods to avoid  Grains  · Wheat, including kamut, durum, and semolina. Barley and bulgur. Couscous. Wheat-based " cereals. Wheat noodles, bread, crackers, and pastries.  Vegetables  · Chicory root, artichoke, asparagus, cabbage, snow peas, sugar snap peas, mushrooms, and cauliflower. Onions, garlic, leeks, and the white part of scallions.  Fruits  · Fresh, dried, and juiced forms of apple, pear, watermelon, peach, plum, cherries, apricots, blackberries, boysenberries, figs, nectarines, and kathy. Avocado.  Dairy  · Milk, yogurt, ice cream, and soft cheese. Cream and sour cream. Milk-based sauces. Custard.  Meats and other protein foods  · Fried or fatty meat. Sausage. Cashews and pistachios. Soybeans, baked beans, black beans, chickpeas, kidney beans, patricio beans, navy beans, lentils, and split peas.  Seasoning and other foods  · Any sugar-free gum or candy. Foods that contain artificial sweeteners such as sorbitol, mannitol, isomalt, or xylitol. Foods that contain honey, high-fructose corn syrup, or agave. Bouillon, vegetable stock, beef stock, and chicken stock. Garlic and onion powder. Condiments made with onion, such as hummus, chutney, pickles, relish, salad dressing, and salsa. Tomato paste.  Beverages  · Chicory-based drinks. Coffee substitutes. Chamomile tea. Fennel tea. Sweet or fortified ellie such as port or huber. Diet soft drinks made with isomalt, mannitol, maltitol, sorbitol, or xylitol. Apple, pear, and kathy juice. Juices with high-fructose corn syrup.  This may not be a complete list of high-FODMAP foods. Talk with your dietitian to discuss what dietary choices are best for you.   Summary  · A low-FODMAP eating plan is a short-term diet that eliminates FODMAPs from your diet to help ease symptoms of certain bowel diseases.  · The eating plan usually lasts up to 6 weeks. After that, high-FODMAP foods are restarted gradually, one at a time, so you can find out which may be causing symptoms.  · A low-FODMAP eating plan can be complicated. It is best to work with a dietitian who has experience with this type of  plan.  This information is not intended to replace advice given to you by your health care provider. Make sure you discuss any questions you have with your health care provider.  Document Released: 08/14/2018 Document Revised: 11/30/2018 Document Reviewed: 08/14/2018  Elsevier Patient Education © 2020 [employer]

## 2020-07-27 ENCOUNTER — TELEPHONE (OUTPATIENT)
Dept: FAMILY MEDICINE CLINIC | Facility: CLINIC | Age: 75
End: 2020-07-27

## 2020-08-04 RX ORDER — FUROSEMIDE 20 MG/1
20 TABLET ORAL DAILY
Qty: 90 TABLET | Refills: 1 | Status: SHIPPED | OUTPATIENT
Start: 2020-08-04 | End: 2021-05-10

## 2020-09-14 DIAGNOSIS — I10 BENIGN ESSENTIAL HYPERTENSION: ICD-10-CM

## 2020-09-15 RX ORDER — DILTIAZEM HYDROCHLORIDE 240 MG/1
CAPSULE, EXTENDED RELEASE ORAL
Qty: 30 CAPSULE | Refills: 0 | Status: SHIPPED | OUTPATIENT
Start: 2020-09-15 | End: 2020-10-20

## 2020-10-17 DIAGNOSIS — I10 BENIGN ESSENTIAL HYPERTENSION: ICD-10-CM

## 2020-10-20 RX ORDER — DILTIAZEM HYDROCHLORIDE 240 MG/1
CAPSULE, EXTENDED RELEASE ORAL
Qty: 30 CAPSULE | Refills: 0 | Status: SHIPPED | OUTPATIENT
Start: 2020-10-20 | End: 2020-11-23 | Stop reason: SDUPTHER

## 2020-11-23 DIAGNOSIS — I10 BENIGN ESSENTIAL HYPERTENSION: ICD-10-CM

## 2020-11-23 RX ORDER — DILTIAZEM HYDROCHLORIDE 240 MG/1
240 CAPSULE, EXTENDED RELEASE ORAL DAILY
Qty: 90 CAPSULE | Refills: 0 | Status: SHIPPED | OUTPATIENT
Start: 2020-11-23 | End: 2021-02-26

## 2021-02-26 DIAGNOSIS — I10 BENIGN ESSENTIAL HYPERTENSION: ICD-10-CM

## 2021-03-02 ENCOUNTER — TELEPHONE (OUTPATIENT)
Dept: FAMILY MEDICINE CLINIC | Facility: CLINIC | Age: 76
End: 2021-03-02

## 2021-03-02 NOTE — TELEPHONE ENCOUNTER
PATIENT IS CALLING AND REQUESTING DR. ROSE CONSIDER HER CURRENT MEDICATIONS AND MEDICAL HEALTH HISTORY AND DETERMINE IF SHE IS SAFE FOR THE COVID-19 VACCINE. SHE HAS NEVER HAD ANY TYPE OF VACCINES ONLY AS A CHILD AND WHEN SHE DID SHE WOULD BREAK OUT IN A RASH. SHE DOES HAVE ECZEMA AND YEARS AGO SHE HAD A BAD BREAK OUT AND THE DR ACTUAL ASKED HER IF SHE HAD EVEN BEEN AROUND SOMEONE WHO HAD RECENTLY GOTTEN A VACCINATION.     SHE WOULD JUST LIKE TO KNOW IF DR. ROSE CONSIDERS THIS VACCINE GENERALLY SAFE FOR HER.       PATIENT CAN BE REACHED AT: [phone]

## 2021-03-03 ENCOUNTER — TELEPHONE (OUTPATIENT)
Dept: FAMILY MEDICINE CLINIC | Facility: CLINIC | Age: 76
End: 2021-03-03

## 2021-03-03 DIAGNOSIS — I10 BENIGN ESSENTIAL HYPERTENSION: ICD-10-CM

## 2021-03-04 NOTE — TELEPHONE ENCOUNTER
She can look on the [facility] website at the vaccine components. As long as she is not allergic to any components of the vaccine I think it is a good idea for her to get the vaccine against COVID-19.

## 2021-03-08 ENCOUNTER — TELEPHONE (OUTPATIENT)
Dept: FAMILY MEDICINE CLINIC | Facility: CLINIC | Age: 76
End: 2021-03-08

## 2021-03-09 RX ORDER — DILTIAZEM HYDROCHLORIDE 240 MG/1
240 CAPSULE, EXTENDED RELEASE ORAL DAILY
Qty: 90 CAPSULE | Refills: 0 | Status: SHIPPED | OUTPATIENT
Start: 2021-03-09 | End: 2021-06-07

## 2021-05-10 RX ORDER — FUROSEMIDE 20 MG/1
20 TABLET ORAL DAILY
Qty: 30 TABLET | Refills: 0 | Status: SHIPPED | OUTPATIENT
Start: 2021-05-10 | End: 2021-10-20

## 2021-05-10 RX ORDER — FUROSEMIDE 20 MG/1
20 TABLET ORAL DAILY
Qty: 90 TABLET | Refills: 0 | Status: SHIPPED | OUTPATIENT
Start: 2021-05-10 | End: 2021-05-10 | Stop reason: SDUPTHER

## 2021-05-26 ENCOUNTER — TELEPHONE (OUTPATIENT)
Dept: FAMILY MEDICINE CLINIC | Facility: CLINIC | Age: 76
End: 2021-05-26

## 2021-06-04 ENCOUNTER — OFFICE VISIT (OUTPATIENT)
Dept: FAMILY MEDICINE CLINIC | Facility: CLINIC | Age: 76
End: 2021-06-04

## 2021-06-04 ENCOUNTER — TELEPHONE (OUTPATIENT)
Dept: GASTROENTEROLOGY | Facility: CLINIC | Age: 76
End: 2021-06-04

## 2021-06-04 VITALS
DIASTOLIC BLOOD PRESSURE: 80 MMHG | RESPIRATION RATE: 17 BRPM | WEIGHT: 191.7 LBS | OXYGEN SATURATION: 99 % | HEIGHT: 62 IN | BODY MASS INDEX: 35.28 KG/M2 | HEART RATE: 62 BPM | TEMPERATURE: 97.1 F | SYSTOLIC BLOOD PRESSURE: 138 MMHG

## 2021-06-04 DIAGNOSIS — Z12.31 ENCOUNTER FOR SCREENING MAMMOGRAM FOR MALIGNANT NEOPLASM OF BREAST: ICD-10-CM

## 2021-06-04 DIAGNOSIS — Z78.0 POSTMENOPAUSAL: ICD-10-CM

## 2021-06-04 DIAGNOSIS — Z00.00 MEDICARE ANNUAL WELLNESS VISIT, SUBSEQUENT: Primary | ICD-10-CM

## 2021-06-04 DIAGNOSIS — E78.5 HYPERLIPIDEMIA, UNSPECIFIED HYPERLIPIDEMIA TYPE: ICD-10-CM

## 2021-06-04 DIAGNOSIS — Z11.59 ENCOUNTER FOR HEPATITIS C SCREENING TEST FOR LOW RISK PATIENT: ICD-10-CM

## 2021-06-04 DIAGNOSIS — Z12.11 COLON CANCER SCREENING: ICD-10-CM

## 2021-06-04 DIAGNOSIS — Z79.899 MEDICATION MANAGEMENT: ICD-10-CM

## 2021-06-04 DIAGNOSIS — I10 BENIGN ESSENTIAL HYPERTENSION: ICD-10-CM

## 2021-06-04 DIAGNOSIS — L40.0 PLAQUE PSORIASIS: ICD-10-CM

## 2021-06-04 PROBLEM — H17.11 CENTRAL CORNEAL OPACITY OF RIGHT EYE: Status: ACTIVE | Noted: 2021-05-17

## 2021-06-04 PROBLEM — H25.13 AGE-RELATED NUCLEAR CATARACT OF BOTH EYES: Status: ACTIVE | Noted: 2021-05-17

## 2021-06-04 PROBLEM — H54.511A LOW VISION RIGHT EYE CATEGORY 1, NORMAL VISION LEFT EYE: Status: ACTIVE | Noted: 2021-05-17

## 2021-06-04 PROCEDURE — G0439 PPPS, SUBSEQ VISIT: HCPCS | Performed by: FAMILY MEDICINE

## 2021-06-04 NOTE — PROGRESS NOTES
The ABCs of the Annual Wellness Visit  Subsequent Medicare Wellness Visit    Chief Complaint   Patient presents with   • Medicare Wellness-subsequent       Subjective   History of Present Illness:  Christine Quick is a 75 y.o. female who presents for a Subsequent Medicare Wellness Visit.    HEALTH RISK ASSESSMENT    Recent Hospitalizations:  No hospitalization(s) within the last year.    Current Medical Providers:  Patient Care Team:  Tamiko Frederick MD as PCP - General (Family Medicine)    Smoking Status:  Social History     Tobacco Use   Smoking Status Never Smoker   Smokeless Tobacco Never Used       Alcohol Consumption:  Social History     Substance and Sexual Activity   Alcohol Use Not Currently   • Alcohol/week: 1.0 standard drinks   • Types: 1 Glasses of wine per week    Comment: special occasions       Depression Screen:   PHQ-2/PHQ-9 Depression Screening 6/4/2021   Little interest or pleasure in doing things 0   Feeling down, depressed, or hopeless 0   Total Score 0       Fall Risk Screen:  IBRAHIMAADI Fall Risk Assessment has not been completed.    Health Habits and Functional and Cognitive Screening:  Functional & Cognitive Status 6/4/2021   Do you have difficulty preparing food and eating? No   Do you have difficulty bathing yourself, getting dressed or grooming yourself? No   Do you have difficulty using the toilet? No   Do you have difficulty moving around from place to place? No   Do you have trouble with steps or getting out of a bed or a chair? No   Current Diet Well Balanced Diet   Dental Exam Not up to date   Eye Exam Up to date   Exercise (times per week) 3 times per week   Current Exercise Activities Include Walking   Do you need help using the phone?  No   Are you deaf or do you have serious difficulty hearing?  No   Do you need help with transportation? No   Do you need help shopping? No   Do you need help preparing meals?  No   Do you need help with housework?  No   Do you need help with laundry? No    Do you need help taking your medications? No   Do you need help managing money? No   Do you ever drive or ride in a car without wearing a seat belt? No   Have you felt unusual stress, anger or loneliness in the last month? Yes   Who do you live with? Spouse   If you need help, do you have trouble finding someone available to you? No   Have you been bothered in the last four weeks by sexual problems? No   Do you have difficulty concentrating, remembering or making decisions? No         Does the patient have evidence of cognitive impairment? No    Asprin use counseling:Does not need ASA (and currently is not on it)    Age-appropriate Screening Schedule:  Refer to the list below for future screening recommendations based on patient's age, sex and/or medical conditions. Orders for these recommended tests are listed in the plan section. The patient has been provided with a written plan.    Health Maintenance   Topic Date Due   • ZOSTER VACCINE (1 of 2) Never done   • DXA SCAN  09/13/2015   • MAMMOGRAM  03/16/2017   • TDAP/TD VACCINES (2 - Tdap) 12/01/2018   • LIPID PANEL  06/04/2021   • INFLUENZA VACCINE  08/01/2021          The following portions of the patient's history were reviewed and updated as appropriate: allergies, current medications, past family history, past medical history, past social history, past surgical history and problem list.    Outpatient Medications Prior to Visit   Medication Sig Dispense Refill   • calcium carbonate-vitamin d 600-400 MG-UNIT per tablet Take by mouth.     • Cholecalciferol (VITAMIN D3) 3000 UNITS tablet Take by mouth.     • desonide (DESOWEN) 0.05 % cream APPLY TOPICALLY BID SPARINGLY AND RUB GENTLY INTO THE AFFECTED AREAS  2   • dilTIAZem (TIAZAC) 240 MG 24 hr capsule TAKE 1 CAPSULE BY MOUTH DAILY 90 capsule 0   • fexofenadine (ALLEGRA) 60 MG tablet Take 60 mg by mouth As Needed.     • Flaxseed, Linseed, (FLAXSEED OIL) 1200 MG capsule Take by mouth.     • fluticasone (FLONASE)  50 MCG/ACT nasal spray 2 sprays into the nostril(s) as directed by provider Daily.     • folic acid (FOLVITE) 1 MG tablet Take 1 mg by mouth Daily.     • furosemide (LASIX) 20 MG tablet Take 1 tablet by mouth Daily. 30 tablet 0   • hydrocortisone 2.5 % cream APPLY THIN LAYER TOPICALLY TO THE AFFECTED AREA TWICE DAILY     • ketoconazole (NIZORAL) 2 % cream   6   • methotrexate 2.5 MG tablet TK 5 TS PO ONE DAY A WEEK UTD     • Multiple Vitamin (MULTI VITAMIN DAILY PO) Take by mouth.     • prednisoLONE acetate (PRED FORTE) 1 % ophthalmic suspension As Needed.  0   • triamcinolone (KENALOG) 0.1 % cream   3     No facility-administered medications prior to visit.       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 Patient has an advance directive (not in EMR), copy requested.    Review of Systems   Constitutional: Negative for activity change and fatigue.   Eyes: Negative for visual disturbance.   Respiratory: Negative for shortness of breath.    Cardiovascular: Negative for chest pain, palpitations and leg swelling.   Neurological: Negative for light-headedness and headaches.       Compared to one year ago, the patient feels her physical health is the same.  Compared to one year ago, the patient feels her mental health is better.    Reviewed chart for potential of high risk medication in the elderly: yes  Reviewed chart for potential of harmful drug interactions in the elderly:yes    Objective  "        Vitals:    06/04/21 1018 06/04/21 1105   BP: 164/92 138/80   BP Location: Left arm Left arm   Patient Position: Sitting Sitting   Cuff Size: Adult Adult   Pulse: 62    Resp: 17    Temp: 97.1 °F (36.2 °C)    TempSrc: Infrared    SpO2: 99%    Weight: 87 kg (191 lb 11.2 oz)    Height: 157.5 cm (62\")    PainSc: 0-No pain        Body mass index is 35.06 kg/m².  Discussed the patient's BMI with her. The BMI is above average; BMI management plan is completed.    Physical Exam  Vitals reviewed.   Constitutional:       General: She is not in acute distress.  HENT:      Right Ear: Tympanic membrane, ear canal and external ear normal.      Left Ear: Tympanic membrane, ear canal and external ear normal.      Nose: Nose normal.      Mouth/Throat:      Mouth: Mucous membranes are moist.      Pharynx: Oropharynx is clear. No oropharyngeal exudate or posterior oropharyngeal erythema.   Eyes:      General: No scleral icterus.        Right eye: No discharge.         Left eye: No discharge.      Conjunctiva/sclera: Conjunctivae normal.   Neck:      Thyroid: No thyromegaly.      Vascular: No carotid bruit.   Cardiovascular:      Rate and Rhythm: Normal rate and regular rhythm.      Heart sounds: Normal heart sounds. No murmur heard.   No friction rub. No gallop.    Pulmonary:      Effort: Pulmonary effort is normal. No respiratory distress.      Breath sounds: Normal breath sounds. No wheezing or rales.   Chest:      Chest wall: No tenderness.   Abdominal:      General: Bowel sounds are normal. There is no distension.      Palpations: Abdomen is soft. There is no mass.      Tenderness: There is no abdominal tenderness. There is no guarding.   Musculoskeletal:         General: No deformity.      Cervical back: Neck supple.      Right lower leg: No edema.      Left lower leg: No edema.   Lymphadenopathy:      Cervical: No cervical adenopathy.   Skin:     General: Skin is warm and dry.   Neurological:      Mental Status: She is " alert and oriented to person, place, and time.      Motor: No abnormal muscle tone.      Coordination: Coordination normal.   Psychiatric:         Mood and Affect: Mood normal.         Behavior: Behavior normal.               Assessment/Plan   Medicare Risks and Personalized Health Plan  CMS Preventative Services Quick Reference  Advance Directive Discussion  Breast Cancer/Mammogram Screening  Immunizations Discussed/Encouraged (specific immunizations; Pneumococcal 23 and Shingrix )  Osteoporosis Risk    The above risks/problems have been discussed with the patient.  Pertinent information has been shared with the patient in the After Visit Summary.  Follow up plans and orders are seen below in the Assessment/Plan Section.    Diagnoses and all orders for this visit:    1. Medicare annual wellness visit, subsequent (Primary)    2. Plaque psoriasis    3. Medication management    4. Encounter for screening mammogram for malignant neoplasm of breast  -     Mammo Screening Bilateral With CAD; Future    5. Postmenopausal  -     DEXA Bone Density Axial; Future    6. Colon cancer screening  -     Ambulatory Referral For Screening Colonoscopy    7. Encounter for hepatitis C screening test for low risk patient  -     Hepatitis C Antibody    8. Benign essential hypertension  -     CBC & Differential  -     Comprehensive Metabolic Panel    9. Hyperlipidemia, unspecified hyperlipidemia type  -     Lipid Panel      Follow Up:  No follow-ups on file.     An After Visit Summary and PPPS were given to the patient.         BP high today when she came in 164/92  Repeat much better. We will keep BP meds the same.   Labs for her dermatologist to manage medication.   Dx with psoriasis since I have seen her.   She is due for screening and wants to proceed. Ordered.

## 2021-06-05 LAB
ALBUMIN SERPL-MCNC: 4.3 G/DL (ref 3.5–5.2)
ALBUMIN/GLOB SERPL: 1.8 G/DL
ALP SERPL-CCNC: 103 U/L (ref 39–117)
ALT SERPL-CCNC: 16 U/L (ref 1–33)
AST SERPL-CCNC: 19 U/L (ref 1–32)
BASOPHILS # BLD AUTO: 0.03 10*3/MM3 (ref 0–0.2)
BASOPHILS NFR BLD AUTO: 0.9 % (ref 0–1.5)
BILIRUB SERPL-MCNC: 0.4 MG/DL (ref 0–1.2)
BUN SERPL-MCNC: 18 MG/DL (ref 8–23)
BUN/CREAT SERPL: 26.1 (ref 7–25)
CALCIUM SERPL-MCNC: 9.5 MG/DL (ref 8.6–10.5)
CHLORIDE SERPL-SCNC: 104 MMOL/L (ref 98–107)
CHOLEST SERPL-MCNC: 182 MG/DL (ref 0–200)
CO2 SERPL-SCNC: 30.7 MMOL/L (ref 22–29)
CREAT SERPL-MCNC: 0.69 MG/DL (ref 0.57–1)
EOSINOPHIL # BLD AUTO: 0.07 10*3/MM3 (ref 0–0.4)
EOSINOPHIL NFR BLD AUTO: 2.1 % (ref 0.3–6.2)
ERYTHROCYTE [DISTWIDTH] IN BLOOD BY AUTOMATED COUNT: 12.7 % (ref 12.3–15.4)
GLOBULIN SER CALC-MCNC: 2.4 GM/DL
GLUCOSE SERPL-MCNC: 86 MG/DL (ref 65–99)
HCT VFR BLD AUTO: 40.3 % (ref 34–46.6)
HCV AB S/CO SERPL IA: 0.3 S/CO RATIO (ref 0–0.9)
HDLC SERPL-MCNC: 89 MG/DL (ref 40–60)
HGB BLD-MCNC: 13.3 G/DL (ref 12–15.9)
IMM GRANULOCYTES # BLD AUTO: 0 10*3/MM3 (ref 0–0.05)
IMM GRANULOCYTES NFR BLD AUTO: 0 % (ref 0–0.5)
LDLC SERPL CALC-MCNC: 82 MG/DL (ref 0–100)
LYMPHOCYTES # BLD AUTO: 1.25 10*3/MM3 (ref 0.7–3.1)
LYMPHOCYTES NFR BLD AUTO: 37.7 % (ref 19.6–45.3)
MCH RBC QN AUTO: 32.9 PG (ref 26.6–33)
MCHC RBC AUTO-ENTMCNC: 33 G/DL (ref 31.5–35.7)
MCV RBC AUTO: 99.8 FL (ref 79–97)
MONOCYTES # BLD AUTO: 0.48 10*3/MM3 (ref 0.1–0.9)
MONOCYTES NFR BLD AUTO: 14.5 % (ref 5–12)
NEUTROPHILS # BLD AUTO: 1.49 10*3/MM3 (ref 1.7–7)
NEUTROPHILS NFR BLD AUTO: 44.8 % (ref 42.7–76)
NRBC BLD AUTO-RTO: 0 /100 WBC (ref 0–0.2)
PLATELET # BLD AUTO: 141 10*3/MM3 (ref 140–450)
POTASSIUM SERPL-SCNC: 4 MMOL/L (ref 3.5–5.2)
PROT SERPL-MCNC: 6.7 G/DL (ref 6–8.5)
RBC # BLD AUTO: 4.04 10*6/MM3 (ref 3.77–5.28)
SODIUM SERPL-SCNC: 141 MMOL/L (ref 136–145)
TRIGL SERPL-MCNC: 54 MG/DL (ref 0–150)
VLDLC SERPL CALC-MCNC: 11 MG/DL (ref 5–40)
WBC # BLD AUTO: 3.32 10*3/MM3 (ref 3.4–10.8)

## 2021-06-06 DIAGNOSIS — I10 BENIGN ESSENTIAL HYPERTENSION: ICD-10-CM

## 2021-06-07 RX ORDER — DILTIAZEM HYDROCHLORIDE 240 MG/1
240 CAPSULE, EXTENDED RELEASE ORAL DAILY
Qty: 90 CAPSULE | Refills: 1 | Status: SHIPPED | OUTPATIENT
Start: 2021-06-07 | End: 2022-01-25

## 2021-08-18 ENCOUNTER — HOSPITAL ENCOUNTER (OUTPATIENT)
Dept: BONE DENSITY | Facility: HOSPITAL | Age: 76
Discharge: HOME | End: 2021-08-18

## 2021-08-18 ENCOUNTER — HOSPITAL ENCOUNTER (OUTPATIENT)
Dept: MAMMOGRAPHY | Facility: HOSPITAL | Age: 76
Discharge: HOME | End: 2021-08-18

## 2021-08-18 DIAGNOSIS — Z12.31 ENCOUNTER FOR SCREENING MAMMOGRAM FOR MALIGNANT NEOPLASM OF BREAST: ICD-10-CM

## 2021-08-18 DIAGNOSIS — Z78.0 POSTMENOPAUSAL: ICD-10-CM

## 2021-08-18 PROCEDURE — 77063 BREAST TOMOSYNTHESIS BI: CPT

## 2021-08-18 PROCEDURE — 77067 SCR MAMMO BI INCL CAD: CPT

## 2021-08-18 PROCEDURE — 77080 DXA BONE DENSITY AXIAL: CPT

## 2021-08-24 ENCOUNTER — OFFICE VISIT (OUTPATIENT)
Dept: GASTROENTEROLOGY | Facility: CLINIC | Age: 76
End: 2021-08-24
Payer: MEDICARE

## 2021-08-24 VITALS
BODY MASS INDEX: 31.1 KG/M2 | DIASTOLIC BLOOD PRESSURE: 78 MMHG | HEART RATE: 68 BPM | HEIGHT: 62 IN | WEIGHT: 169 LBS | SYSTOLIC BLOOD PRESSURE: 136 MMHG | OXYGEN SATURATION: 98 % | TEMPERATURE: 97.8 F

## 2021-08-24 DIAGNOSIS — Z12.11 ENCOUNTER FOR SCREENING FOR MALIGNANT NEOPLASM OF COLON: Primary | ICD-10-CM

## 2021-08-24 PROCEDURE — 99202 OFFICE O/P NEW SF 15 MIN: CPT | Performed by: INTERNAL MEDICINE

## 2021-08-30 ENCOUNTER — TELEPHONE (OUTPATIENT)
Dept: FAMILY MEDICINE CLINIC | Facility: CLINIC | Age: 76
End: 2021-08-30

## 2021-08-30 NOTE — TELEPHONE ENCOUNTER
Pt would like to know if her methotrexate can be changed due to being ineffective now. Please advise. She wanted to know if IBU or anther drug would work.

## 2021-08-31 ENCOUNTER — OFFICE VISIT (OUTPATIENT)
Dept: FAMILY MEDICINE CLINIC | Facility: CLINIC | Age: 76
End: 2021-08-31

## 2021-08-31 VITALS
RESPIRATION RATE: 16 BRPM | TEMPERATURE: 97.1 F | BODY MASS INDEX: 35.22 KG/M2 | SYSTOLIC BLOOD PRESSURE: 134 MMHG | HEART RATE: 72 BPM | OXYGEN SATURATION: 97 % | WEIGHT: 191.4 LBS | HEIGHT: 62 IN | DIASTOLIC BLOOD PRESSURE: 74 MMHG

## 2021-08-31 DIAGNOSIS — M54.41 CHRONIC RIGHT-SIDED LOW BACK PAIN WITH RIGHT-SIDED SCIATICA: ICD-10-CM

## 2021-08-31 DIAGNOSIS — G89.29 CHRONIC RIGHT-SIDED LOW BACK PAIN WITH RIGHT-SIDED SCIATICA: ICD-10-CM

## 2021-08-31 DIAGNOSIS — M54.31 RIGHT SIDED SCIATICA: Primary | ICD-10-CM

## 2021-08-31 PROCEDURE — 99214 OFFICE O/P EST MOD 30 MIN: CPT | Performed by: FAMILY MEDICINE

## 2021-08-31 RX ORDER — FAMOTIDINE 40 MG/1
40 TABLET, FILM COATED ORAL DAILY
Qty: 14 TABLET | Refills: 0 | Status: SHIPPED | OUTPATIENT
Start: 2021-08-31 | End: 2022-06-07

## 2021-08-31 RX ORDER — FAMOTIDINE 40 MG/1
40 TABLET, FILM COATED ORAL DAILY
COMMUNITY
End: 2021-08-31 | Stop reason: SDUPTHER

## 2021-08-31 RX ORDER — METHYLPREDNISOLONE 4 MG/1
TABLET ORAL
Qty: 21 EACH | Refills: 0 | Status: SHIPPED | OUTPATIENT
Start: 2021-08-31 | End: 2022-06-07

## 2021-08-31 NOTE — PROGRESS NOTES
"Chief Complaint  Pain (R LEG with numbness thigh down to calf )    Subjective          Christine Quick presents to [facility]  History of Present Illness  Right leg pain and gets pain in the lower leg and goes up the side of the leg to the thigh. Laterally only on the right almost up to the hip.   She has compression but these are so tight they are hard to get on.   She says she is taking tylenol for the pain. Helps maybe after an hours and half after taking. She   Sometimes her leg would go numb, other times throbbing. Says the pain can just hit. She could not stay in bed.     She is also on the methotrexate. Does not help with the pain anymore. Helps with the psoriasis.     Dr Rosenthal sent her or rheum evaluation but she has yet to get an appt. Says this was about 5 months ago. She was referred to Dr. Payne.     Objective   Vital Signs:   /74 (BP Location: Right arm, Patient Position: Sitting, Cuff Size: Adult)   Pulse 72   Temp 97.1 °F (36.2 °C) (Infrared)   Resp 16   Ht 157.5 cm (62.01\")   Wt 86.8 kg (191 lb 6.4 oz)   SpO2 97%   BMI 35.00 kg/m²     Physical Exam  Vitals reviewed.   Constitutional:       General: She is not in acute distress.  Eyes:      General: No scleral icterus.     Conjunctiva/sclera: Conjunctivae normal.   Pulmonary:      Effort: Pulmonary effort is normal. No respiratory distress.   Musculoskeletal:         General: No tenderness.      Right lower leg: No edema.      Left lower leg: No edema.      Comments: No palpable spinal tenderness. No paraspinal tenderness in the lumbar region. No right lateral hip tenderness. Negative bilateral straight leg raise. She has 5 out of 5 lower extremity strength bilaterally. No ankle or knee edema. She has compression socks in place.   Neurological:      Mental Status: She is alert and oriented to person, place, and time.   Psychiatric:         Behavior: Behavior normal.        Result Review :               "   Assessment and Plan    Diagnoses and all orders for this visit:    1. Right sided sciatica (Primary)  -     Ambulatory Referral to Physical Therapy Evaluate and treat    2. Chronic right-sided low back pain with right-sided sciatica  -     Ambulatory Referral to Physical Therapy Evaluate and treat    Other orders  -     methylPREDNISolone (MEDROL) 4 MG dose pack; Take as directed on package instructions.  Dispense: 21 each; Refill: 0  -     famotidine (PEPCID) 40 MG tablet; Take 1 tablet by mouth Daily. Take on every morning with your steroid pills  Dispense: 14 tablet; Refill: 0        Follow Up   No follow-ups on file.  Patient was given instructions and counseling regarding her condition or for health maintenance advice. Please see specific information pulled into the AVS if appropriate.     Her symptoms given that they are over the right lateral thigh past the knee and down into the lateral and anterior aspect of the lower portion of the leg are consistent with sciatica. She describes it as a numbness feeling. She is not having any persistent significant low back pain but she does have periodic low back pain more on the right. She says twinges of pain. She did describe significant low back pain contributing to her being unable to get comfortable last night in bed in addition to the right lower extremity symptoms and that she had to get up. She later went on to say that her back did not hurt her last night but does periodically.  Her leg given its numbness has given out though she has not fallen. She says she does not feel like the way leg is weak it is more about not being able to sense the leg. No loss of bowel or bladder function. She was having significant lower extremity swelling but the compression stockings and socks helped significantly and got rid of it.  We talked about recommendations for physical therapy, anti-inflammatory. Going to do just a brief course of prednisone. Will take this with  famotidine daily given that she is on methotrexate. She may be coming off of this medication but I told her it is really important for her to discuss it with her dermatologist as she may need a backup option or dose adjustment. She would also like my help in getting her scheduled with her rheumatologist. Says she was referred there 5 months ago but she still has not been able to get an appointment. I want her to see Dr. Payne. This is related to her significant joint pain related to psoriasis. She feels methotrexate keeps her infrequent skin flareups at bay but she has pain despite the methotrexate and the increase in dose of methotrexate.

## 2021-08-31 NOTE — TELEPHONE ENCOUNTER
I think she is on methotrexate from dermatology. She has to discuss this with them. It is a very special kind of medication and can't be replaced with just anything. The prescriber needs to know that she wants to stop it. I strongly encourage her to contact their office so they can help her with her treatment.

## 2021-09-07 RX ORDER — METHYLPREDNISOLONE 4 MG/1
TABLET ORAL
Qty: 21 EACH | Refills: 0 | OUTPATIENT
Start: 2021-09-07

## 2021-09-07 RX ORDER — FAMOTIDINE 40 MG/1
40 TABLET, FILM COATED ORAL DAILY
Qty: 14 TABLET | Refills: 0 | OUTPATIENT
Start: 2021-09-07

## 2021-09-07 NOTE — TELEPHONE ENCOUNTER
Caller: Ynes Christine NAMITA    Relationship: Self    Best call back number: [phone]    Medication needed:   Requested Prescriptions     Pending Prescriptions Disp Refills   • methylPREDNISolone (MEDROL) 4 MG dose pack 21 each 0     Sig: Take as directed on package instructions.   • famotidine (PEPCID) 40 MG tablet 14 tablet 0     Sig: Take 1 tablet by mouth Daily. Take on every morning with your steroid pills       When do you need the refill by: ASAP    What additional details did the patient provide when requesting the medication: PATIENT STATES THAT SHE WOULD LIKE TO BEGIN THERAPY AS DISCUSSED AT THE LAST APPOINTMENT BUT WILL NOT BE ABLE TO BEGIN THERAPY UNTIL NEXT MONTH.  SHE WANTS TO KNOW IF SHE CAN GET A REFILL OF THESE MEDICATIONS TO HELP HER UNTIL SHE CAN BEGIN THERAPY.    Does the patient have less than a 3 day supply:  [x] Yes  [] No    What is the patient's preferred pharmacy: [facility] #[id number] [place], [place] - [address] AT [facility] [address] - [phone] [facility] [phone] FX

## 2021-09-09 RX ORDER — FAMOTIDINE 40 MG/1
TABLET, FILM COATED ORAL
Qty: 14 TABLET | Refills: 0 | OUTPATIENT
Start: 2021-09-09

## 2021-09-09 RX ORDER — METHYLPREDNISOLONE 4 MG/1
TABLET ORAL
Qty: 21 EACH | Refills: 0 | OUTPATIENT
Start: 2021-09-09

## 2021-10-07 ENCOUNTER — TELEPHONE (OUTPATIENT)
Dept: FAMILY MEDICINE CLINIC | Facility: CLINIC | Age: 76
End: 2021-10-07

## 2021-10-07 NOTE — TELEPHONE ENCOUNTER
Patient states that Dr Frederick prescribed her a Medrol Dose Pack in the past and she has been having some occasional leg swelling. She states it doesn't happen all the time but she was wondering if Dr Frederick would be willing to call this in for her so she could have this on hand when it flares up.    Please advise. I did inform patient it might be a few days before she gets a response due to Dr Frederick being out sick, she understood.    Also she prefers [facility].

## 2021-10-08 NOTE — TELEPHONE ENCOUNTER
I do not give steroids to have on hand. Steroids should not be taken often. We did steroids on 8/31/21 for her sciatica. I would not take steroids for swelling in the legs, they can cause swelling. The compression socks and furosemide/Lasix are for her swelling. For the sciatica to keep that from flaring PT is recommended. I could offer her some websites for exercises or refer her to PT. Thanks.

## 2021-10-12 NOTE — TELEPHONE ENCOUNTER
Called patient and attempted to speak with her. Unable to speak with her due to no answer and no VM.

## 2021-10-20 RX ORDER — FUROSEMIDE 20 MG/1
20 TABLET ORAL DAILY
Qty: 90 TABLET | Refills: 1 | Status: SHIPPED | OUTPATIENT
Start: 2021-10-20 | End: 2022-04-01

## 2021-10-20 RX ORDER — FUROSEMIDE 20 MG/1
20 TABLET ORAL DAILY
Qty: 30 TABLET | Refills: 0 | Status: SHIPPED | OUTPATIENT
Start: 2021-10-20 | End: 2021-10-20

## 2021-10-20 NOTE — TELEPHONE ENCOUNTER
Rx Refill Note  Requested Prescriptions     Pending Prescriptions Disp Refills   • furosemide (LASIX) 20 MG tablet [Pharmacy Med Name: FUROSEMIDE 20MG TABLETS] 90 tablet      Sig: TAKE 1 TABLET BY MOUTH DAILY      Last office visit with prescribing clinician: 8/31/2021      Next office visit with prescribing clinician: Visit date not found            Dino Mcgowan MA  10/20/21, 17:24 EDT

## 2021-10-20 NOTE — TELEPHONE ENCOUNTER
Rx Refill Note  Requested Prescriptions     Pending Prescriptions Disp Refills   • furosemide (LASIX) 20 MG tablet [Pharmacy Med Name: FUROSEMIDE 20MG TABLETS] 30 tablet 0     Sig: TAKE 1 TABLET BY MOUTH DAILY      Last office visit with prescribing clinician: 8/31/2021      Next office visit with prescribing clinician: Visit date not found            Beverly Price MA  10/20/21, 11:58 EDT

## 2022-01-24 DIAGNOSIS — I10 BENIGN ESSENTIAL HYPERTENSION: ICD-10-CM

## 2022-01-25 RX ORDER — DILTIAZEM HYDROCHLORIDE 240 MG/1
240 CAPSULE, EXTENDED RELEASE ORAL DAILY
Qty: 90 CAPSULE | Refills: 0 | Status: SHIPPED | OUTPATIENT
Start: 2022-01-25 | End: 2022-04-25

## 2022-01-25 NOTE — TELEPHONE ENCOUNTER
Please call pt and get her set up for an appointment. Ideally she would be seen in February to keep her with every 6 months. Thanks.

## 2022-01-28 NOTE — TELEPHONE ENCOUNTER
Called and left this patient a detailed VM asking her to call this nurse back.    HUB transfer this patient to the office to speak with this nurse.

## 2022-04-01 RX ORDER — FUROSEMIDE 20 MG/1
20 TABLET ORAL DAILY
Qty: 90 TABLET | Refills: 0 | Status: SHIPPED | OUTPATIENT
Start: 2022-04-01 | End: 2022-12-19

## 2022-04-01 NOTE — TELEPHONE ENCOUNTER
Rx Refill Note  Requested Prescriptions     Pending Prescriptions Disp Refills   • furosemide (LASIX) 20 MG tablet [Pharmacy Med Name: FUROSEMIDE 20MG TABLETS] 90 tablet 1     Sig: TAKE 1 TABLET BY MOUTH DAILY      Last office visit with prescribing clinician: 8/31/2021      Next office visit with prescribing clinician: Visit date not found            Chely Gibbs LPN  04/01/22, 09:56 EDT

## 2022-04-01 NOTE — TELEPHONE ENCOUNTER
Pt needs a visits. She is someone we would ideally have in for monitoring every 6 months. So when she can get in next in a month or so will be good. Thanks.

## 2022-04-24 DIAGNOSIS — I10 BENIGN ESSENTIAL HYPERTENSION: ICD-10-CM

## 2022-04-25 RX ORDER — DILTIAZEM HYDROCHLORIDE 240 MG/1
240 CAPSULE, EXTENDED RELEASE ORAL DAILY
Qty: 90 CAPSULE | Refills: 0 | Status: SHIPPED | OUTPATIENT
Start: 2022-04-25 | End: 2022-07-25

## 2022-04-25 NOTE — TELEPHONE ENCOUNTER
Rx Refill Note  Requested Prescriptions     Pending Prescriptions Disp Refills   • dilTIAZem (TIAZAC) 240 MG 24 hr capsule [Pharmacy Med Name: DILTIAZEM ER 240MG CAPSULES (24 HR)] 90 capsule 0     Sig: TAKE 1 CAPSULE BY MOUTH DAILY      Last office visit with prescribing clinician: 8/31/2021      Next office visit with prescribing clinician: 6/7/2022            Chely Gibbs LPN  04/25/22, 13:56 EDT

## 2022-06-07 ENCOUNTER — OFFICE VISIT (OUTPATIENT)
Dept: FAMILY MEDICINE CLINIC | Facility: CLINIC | Age: 77
End: 2022-06-07

## 2022-06-07 VITALS
TEMPERATURE: 96.9 F | OXYGEN SATURATION: 94 % | HEIGHT: 63 IN | SYSTOLIC BLOOD PRESSURE: 162 MMHG | BODY MASS INDEX: 34.57 KG/M2 | DIASTOLIC BLOOD PRESSURE: 80 MMHG | HEART RATE: 63 BPM | WEIGHT: 195.1 LBS

## 2022-06-07 DIAGNOSIS — R60.0 BILATERAL LOWER EXTREMITY EDEMA: ICD-10-CM

## 2022-06-07 DIAGNOSIS — I10 BENIGN ESSENTIAL HYPERTENSION: Primary | ICD-10-CM

## 2022-06-07 DIAGNOSIS — F41.9 ANXIETY: ICD-10-CM

## 2022-06-07 PROBLEM — I83.813 PAIN DUE TO VARICOSE VEINS OF BOTH LOWER EXTREMITIES: Status: ACTIVE | Noted: 2022-06-07

## 2022-06-07 PROCEDURE — 99214 OFFICE O/P EST MOD 30 MIN: CPT | Performed by: FAMILY MEDICINE

## 2022-06-07 RX ORDER — ESCITALOPRAM OXALATE 5 MG/1
5 TABLET ORAL DAILY
Qty: 90 TABLET | Refills: 0 | Status: SHIPPED | OUTPATIENT
Start: 2022-06-07 | End: 2022-09-20

## 2022-06-07 RX ORDER — LOSARTAN POTASSIUM 25 MG/1
25 TABLET ORAL DAILY
Qty: 90 TABLET | Refills: 0 | Status: SHIPPED | OUTPATIENT
Start: 2022-06-07 | End: 2022-09-20

## 2022-07-23 DIAGNOSIS — I10 BENIGN ESSENTIAL HYPERTENSION: ICD-10-CM

## 2022-07-25 RX ORDER — DILTIAZEM HYDROCHLORIDE 240 MG/1
240 CAPSULE, EXTENDED RELEASE ORAL DAILY
Qty: 90 CAPSULE | Refills: 0 | Status: SHIPPED | OUTPATIENT
Start: 2022-07-25

## 2022-07-25 NOTE — TELEPHONE ENCOUNTER
This pt really needs an appointment in September. She had not well controlled BP and started new medication and also started her on a new medication for anxiety. She needs her short term follow up. Then we can sort out her AWV.

## 2022-07-25 NOTE — TELEPHONE ENCOUNTER
Rx Refill Note  Requested Prescriptions     Pending Prescriptions Disp Refills   • dilTIAZem (TIAZAC) 240 MG 24 hr capsule [Pharmacy Med Name: DILTIAZEM ER 240MG CAPSULES (24 HR)] 90 capsule 0     Sig: TAKE 1 CAPSULE BY MOUTH DAILY      Last office visit with prescribing clinician: 6/7/2022      Next office visit with prescribing clinician: Visit date not found            Chely Gibbs LPN  07/25/22, 08:21 EDT

## 2022-09-18 NOTE — PROGRESS NOTES
Problem: Discharge Planning  Goal: Discharge to home or other facility with appropriate resources  Outcome: Progressing     Problem: Pain  Goal: Verbalizes/displays adequate comfort level or baseline comfort level  Outcome: Progressing     Problem: Safety - Adult  Goal: Free from fall injury  Outcome: Progressing     Problem: Skin/Tissue Integrity  Goal: Absence of new skin breakdown  Description: 1. Monitor for areas of redness and/or skin breakdown  2. Assess vascular access sites hourly  3. Every 4-6 hours minimum:  Change oxygen saturation probe site  4. Every 4-6 hours:  If on nasal continuous positive airway pressure, respiratory therapy assess nares and determine need for appliance change or resting period.   Outcome: Progressing     Problem: Respiratory - Adult  Goal: Achieves optimal ventilation and oxygenation  Outcome: Progressing     Problem: Cardiovascular - Adult  Goal: Maintains optimal cardiac output and hemodynamic stability  Outcome: Progressing     Problem: Skin/Tissue Integrity - Adult  Goal: Skin integrity remains intact  Outcome: Progressing  Goal: Incisions, wounds, or drain sites healing without S/S of infection  Outcome: Progressing     Problem: Musculoskeletal - Adult  Goal: Return mobility to safest level of function  Outcome: Progressing  Goal: Return ADL status to a safe level of function  Outcome: Progressing     Problem: Chronic Conditions and Co-morbidities  Goal: Patient's chronic conditions and co-morbidity symptoms are monitored and maintained or improved  Outcome: Progressing Subjective   Christine Quick is a 74 y.o. female.     Chief Complaint   Patient presents with   • Hypertension   • Leg Swelling   • Earache        History of Present Illness  Ear pain  Pain in left ear some congestion last few days. And some tenderness in neck with head turning.     LE edema  Having some swelling in her left ankle, was on the lateral side then on both sides. Up into the leg. She went to  and they thought it was from the water pill. Could be gout.   She thought that she had sprained her ankle she was worried about the swelling so she went to .  She was given prednisone. She said that made every thing better. The swelling, the knee pain she had been having, her allergies. She says does have swelling in the left leg every day but better than when she was seen at . The support hose help some. She swells more as the day goes on. Foot injury on that side. Bad veins on that side.     New dx psoriasis  Has been dx with psoriasis with psoriatic arthritis for which she was prescribed methotrexate. Sees dermatology Dr. Rosenthal. Was given prescription for folic acid and methotrexate. She started just taking the methotrexate weekly prn with flare of the psoriasis.     Blindness right eye  She was told she needs a corneal transplant but has declined at this time because of other things she is working. She can't see well with the right eye. 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 Short trips. Really just needs glasses for reading. If she is very close to something she can make out what it is with her right eye.     The following portions of the patient's history were reviewed and updated as appropriate: allergies, current medications, past medical history, past social history and problem list.    Review of Systems   Constitutional: Negative for activity change and appetite change.   HENT: Positive for congestion and ear pain.    Respiratory: Negative for shortness of breath.    Cardiovascular: Positive  "for leg swelling. Negative for chest pain.       Objective   /78 (BP Location: Left arm, Patient Position: Sitting, Cuff Size: Adult)   Pulse 62   Temp 98.4 °F (36.9 °C) (Oral)   Resp 13   Ht 157.5 cm (62\")   Wt 85.4 kg (188 lb 3.2 oz)   LMP  (LMP Unknown)   SpO2 96%   Breastfeeding No   BMI 34.42 kg/m²   Physical Exam   Constitutional: She is oriented to person, place, and time. She appears well-nourished. No distress.   HENT:   Right Ear: External ear normal.   Left Ear: External ear normal.   Nose: Nose normal.   Mouth/Throat: Oropharynx is clear and moist.   TMs and canals are clear, normal. Edematous pale turbinates.    Eyes: Conjunctivae are normal. Right eye exhibits no discharge. Left eye exhibits no discharge. No scleral icterus.   Neck: Neck supple. No thyromegaly present.   Cardiovascular: Normal rate, regular rhythm, normal heart sounds and intact distal pulses.   No murmur heard.  Pulmonary/Chest: Effort normal and breath sounds normal. No respiratory distress. She has no wheezes.   Musculoskeletal: She exhibits edema.   She has left ankle and lower leg, few inches above ankle edema, mild. Non-pitting.    Lymphadenopathy:     She has no cervical adenopathy.   Neurological: She is alert and oriented to person, place, and time. She exhibits normal muscle tone.   Psychiatric: She has a normal mood and affect. Her behavior is normal.   Vitals reviewed.      Assessment/Plan   Christine was seen today for hypertension, leg swelling and earache.    Diagnoses and all orders for this visit:    Benign essential hypertension  -     Comprehensive Metabolic Panel  -     CBC & Differential    Left leg swelling  -     Comprehensive Metabolic Panel  -     CBC & Differential    Elevated HDL  -     Lipid Panel    Medication management  -     CBC & Differential    Allergic rhinitis, unspecified seasonality, unspecified trigger    Other orders  -     furosemide (LASIX) 20 MG tablet; Take 1 tablet by mouth " Daily.      BP is well controlled  Would like to adjust medication related to concern for the HCTZ being possibly causing the LE edema. We discussed that if something systemic were causing the swelling would consider the problem would be bilateral. Also would be more common for the CCB to be causing the swelling. She has local reasons for LLE to be more swollen including anatomical, prior vein damage on the left, prior foot injury on left with scarring. She does have some improvement with the compression socks and was encouraged to continue using these during the day.   We will d/c the HCTZ and start low dose furosemide. She does have urinary urgency at times but no complaints of incontinence. She is going to follow up close in 1 month.     Ears look normal I think this is related to arthritis. Her turbinates are large and puffy and pale. Recommended flonase and instructed on appropriate administration.     Encouraged her to take her methotrexate every week even if she does not have symptoms and to take her folic acid daily.     Will try to do wellness at that visit if possible.

## 2022-09-19 NOTE — TELEPHONE ENCOUNTER
Rx Refill Note  Requested Prescriptions     Pending Prescriptions Disp Refills   • losartan (COZAAR) 25 MG tablet [Pharmacy Med Name: LOSARTAN 25MG TABLETS] 90 tablet 0     Sig: TAKE 1 TABLET BY MOUTH DAILY      Last office visit with prescribing clinician: 6/7/2022      Next office visit with prescribing clinician: 6/19/2023            Chely Gibbs LPN  09/19/22, 16:16 EDT

## 2022-09-20 RX ORDER — ESCITALOPRAM OXALATE 5 MG/1
5 TABLET ORAL DAILY
Qty: 90 TABLET | Refills: 0 | Status: SHIPPED | OUTPATIENT
Start: 2022-09-20 | End: 2022-12-27

## 2022-09-20 RX ORDER — LOSARTAN POTASSIUM 25 MG/1
25 TABLET ORAL DAILY
Qty: 90 TABLET | Refills: 0 | Status: SHIPPED | OUTPATIENT
Start: 2022-09-20 | End: 2022-12-20

## 2022-09-20 NOTE — TELEPHONE ENCOUNTER
This pt has poorly controlled blood pressure. She missed her BP visit follow up and her visit with me because she cancelled. She needs to be seen by me in the next week or two for her blood pressure and anxiety that she was going to start a new medication for. Thanks.

## 2022-09-20 NOTE — TELEPHONE ENCOUNTER
Rx Refill Note  Requested Prescriptions     Pending Prescriptions Disp Refills   • escitalopram (LEXAPRO) 5 MG tablet [Pharmacy Med Name: ESCITALOPRAM 5MG TABLETS] 90 tablet 0     Sig: TAKE 1 TABLET BY MOUTH DAILY      Last office visit with prescribing clinician: 6/7/2022      Next office visit with prescribing clinician: 6/19/2023            Chely Gibbs LPN  09/20/22, 07:32 EDT

## 2022-10-17 ENCOUNTER — TELEPHONE (OUTPATIENT)
Dept: FAMILY MEDICINE CLINIC | Facility: CLINIC | Age: 77
End: 2022-10-17

## 2022-10-17 NOTE — TELEPHONE ENCOUNTER
Caller: Christine Quick    Relationship to patient: Self    Best call back number: *[phone] (Mobile)    Date of exposure:  LAST WEEK - Saturday     Date of positive COVID19 test: *NOT YET    Date if possible COVID19 exposure:      COVID19 symptoms:  CHILLS HEADACHE  NAUSEA, WEAK, COUGHING     Date of initial quarantine:     Additional information or concerns:   HAS HAD VACCINES AND BOOSTERS    What is the patients preferred pharmacy:  [facility] #[id number] [address] AT [facility] [address] - [phone]  - [phone]   [phone]

## 2022-10-18 ENCOUNTER — OFFICE VISIT (OUTPATIENT)
Dept: FAMILY MEDICINE CLINIC | Facility: CLINIC | Age: 77
End: 2022-10-18

## 2022-10-18 ENCOUNTER — LAB (OUTPATIENT)
Dept: LAB | Facility: HOSPITAL | Age: 77
End: 2022-10-18

## 2022-10-18 VITALS — HEART RATE: 79 BPM | TEMPERATURE: 97.8 F | OXYGEN SATURATION: 98 %

## 2022-10-18 DIAGNOSIS — J06.9 UPPER RESPIRATORY TRACT INFECTION DUE TO COVID-19 VIRUS: Primary | ICD-10-CM

## 2022-10-18 DIAGNOSIS — U07.1 UPPER RESPIRATORY TRACT INFECTION DUE TO COVID-19 VIRUS: Primary | ICD-10-CM

## 2022-10-18 DIAGNOSIS — R05.1 ACUTE COUGH: Primary | ICD-10-CM

## 2022-10-18 DIAGNOSIS — Z51.81 MEDICATION MONITORING ENCOUNTER: ICD-10-CM

## 2022-10-18 LAB
ALBUMIN SERPL-MCNC: 3.9 G/DL (ref 3.5–5.2)
ALBUMIN/GLOB SERPL: 1.3 G/DL
ALP SERPL-CCNC: 85 U/L (ref 39–117)
ALT SERPL W P-5'-P-CCNC: 18 U/L (ref 1–33)
ANION GAP SERPL CALCULATED.3IONS-SCNC: 5.5 MMOL/L (ref 5–15)
AST SERPL-CCNC: 29 U/L (ref 1–32)
BILIRUB SERPL-MCNC: 0.3 MG/DL (ref 0–1.2)
BUN SERPL-MCNC: 13 MG/DL (ref 8–23)
BUN/CREAT SERPL: 18.3 (ref 7–25)
CALCIUM SPEC-SCNC: 9.4 MG/DL (ref 8.6–10.5)
CHLORIDE SERPL-SCNC: 102 MMOL/L (ref 98–107)
CO2 SERPL-SCNC: 30.5 MMOL/L (ref 22–29)
CREAT SERPL-MCNC: 0.71 MG/DL (ref 0.57–1)
EGFRCR SERPLBLD CKD-EPI 2021: 87.2 ML/MIN/1.73
EXPIRATION DATE: ABNORMAL
FLUAV AG UPPER RESP QL IA.RAPID: NOT DETECTED
FLUBV AG UPPER RESP QL IA.RAPID: NOT DETECTED
GLOBULIN UR ELPH-MCNC: 2.9 GM/DL
GLUCOSE SERPL-MCNC: 98 MG/DL (ref 65–99)
INTERNAL CONTROL: ABNORMAL
Lab: ABNORMAL
POTASSIUM SERPL-SCNC: 3.8 MMOL/L (ref 3.5–5.2)
PROT SERPL-MCNC: 6.8 G/DL (ref 6–8.5)
SARS-COV-2 AG UPPER RESP QL IA.RAPID: DETECTED
SODIUM SERPL-SCNC: 138 MMOL/L (ref 136–145)

## 2022-10-18 PROCEDURE — 99214 OFFICE O/P EST MOD 30 MIN: CPT | Performed by: FAMILY MEDICINE

## 2022-10-18 PROCEDURE — 80053 COMPREHEN METABOLIC PANEL: CPT | Performed by: FAMILY MEDICINE

## 2022-10-18 PROCEDURE — 36415 COLL VENOUS BLD VENIPUNCTURE: CPT | Performed by: FAMILY MEDICINE

## 2022-10-18 PROCEDURE — 87428 SARSCOV & INF VIR A&B AG IA: CPT | Performed by: FAMILY MEDICINE

## 2022-10-18 NOTE — PROGRESS NOTES
"Chief Complaint  Cough (C/o s/s x 3 days), Fever, and Chills    Subjective        Christine Quick presents to [facility]  History of Present Illness  Christine is a 77-year-old female who was seen today for a COVID-19 infection.     She states her  tested positive on Sunday, 10/16/2022, and she began having symptoms that afternoon. She notes her cough today is not that bad. She denies shortness of breath. She occasionally has some body aches, but she also has arthritis.     She is only taking Allegra.  Objective   Vital Signs:  Pulse 79   Temp 97.8 °F (36.6 °C)   SpO2 98%   Estimated body mass index is 34.56 kg/m² as calculated from the following:    Height as of 6/7/22: 160 cm (63\").    Weight as of 6/7/22: 88.5 kg (195 lb 1.6 oz).          Physical Exam  Constitutional:       Appearance: Normal appearance. She is well-developed.   HENT:      Head: Normocephalic and atraumatic.      Right Ear: Tympanic membrane, ear canal and external ear normal.      Left Ear: Tympanic membrane, ear canal and external ear normal.      Nose: Nose normal.      Mouth/Throat:      Mouth: Mucous membranes are moist.   Eyes:      General: No scleral icterus.     Extraocular Movements: Extraocular movements intact.      Conjunctiva/sclera: Conjunctivae normal.      Pupils: Pupils are equal, round, and reactive to light.   Neck:      Thyroid: No thyromegaly.   Cardiovascular:      Rate and Rhythm: Normal rate and regular rhythm.      Pulses: Normal pulses.      Heart sounds: Normal heart sounds.   Pulmonary:      Effort: Pulmonary effort is normal. No respiratory distress.      Breath sounds: Normal breath sounds. No wheezing or rales.   Abdominal:      General: Bowel sounds are normal. There is no distension.      Palpations: Abdomen is soft. There is no mass.      Tenderness: There is no abdominal tenderness.      Hernia: No hernia is present.   Musculoskeletal:         General: No swelling or " tenderness. Normal range of motion.      Cervical back: Normal range of motion and neck supple.   Lymphadenopathy:      Cervical: No cervical adenopathy.   Skin:     General: Skin is warm.   Neurological:      General: No focal deficit present.      Mental Status: She is alert and oriented to person, place, and time.      Cranial Nerves: No cranial nerve deficit.      Sensory: No sensory deficit.      Deep Tendon Reflexes: Reflexes normal.   Psychiatric:         Mood and Affect: Mood normal.         Behavior: Behavior normal.         Thought Content: Thought content normal.         Judgment: Judgment normal.        Result Review :                Assessment and Plan   Diagnoses and all orders for this visit:    1. Upper respiratory tract infection due to COVID-19 virus (Primary)  -     Nirmatrelvir&Ritonavir 300/100 (PAXLOVID) 20 x 150 MG & 10 x 100MG tablet therapy pack tablet; Take 3 tablets by mouth 2 (Two) Times a Day for 5 days.  Dispense: 30 tablet; Refill: 0    2. Medication monitoring encounter  -     Comprehensive Metabolic Panel  Christine Abebe is a 77-year-old female patient who came here for today for URI secondary to COVID-19 infection, patient symptoms started on Sunday,10/16/2022. She is on day three. The available medication discussed with patient. She agreed to take the Paxlovid , the side effects were discussed with the patient. Information also given on the Paxlovid. I informed patient that paxlovid has emergency authorized by FDA but not approved by  FDA. Her last labs was more than 1 year ago. Normal GFR, a CMP will be repeated today. Drug interaction reviewed. She will start on paxlovid. She will be called after her lab results come back and start on the medication. Follow-up as needed go to the emergency room for worsening or for shortness of breath, chest pain, or worsening of her symptoms.           Follow Up   No follow-ups on file.  Patient was given instructions and counseling regarding her  condition or for health maintenance advice. Please see specific information pulled into the AVS if appropriate.     Transcribed from ambient dictation for Mily Gomez MD by Beverly Lovell.  10/18/22   11:22 EDT    Patient or patient representative verbalized consent to the visit recording.

## 2022-11-09 ENCOUNTER — TELEPHONE (OUTPATIENT)
Dept: FAMILY MEDICINE CLINIC | Facility: CLINIC | Age: 77
End: 2022-11-09

## 2022-11-09 NOTE — TELEPHONE ENCOUNTER
Caller: Christine Quick    Relationship: Self    Best call back number: [phone]    What is the best time to reach you: ANY TIME    Who are you requesting to speak with (clinical staff, provider,  specific staff member): DR. ROSE    What was the call regarding: PATIENT IS WANTING TO KNOW IF SHE SHOULD GET COVID AND FLU VACCINE AT THE SAME TIME.    ALSO, PATIENT TESTED POSITIVE ABOUT 3 WEEKS AGO FOR COVID AND WANTS TO KNOW HOW SOON SHE CAN GET THE BOOSTER.    PLEASE ADVISE    Do you require a callback: YES

## 2022-11-09 NOTE — TELEPHONE ENCOUNTER
Spoke to patient and told her she needed to wait 90 days before she got the Covid booster, and she can go ahead and get the Flu shot.

## 2022-12-19 RX ORDER — FUROSEMIDE 20 MG/1
20 TABLET ORAL DAILY
Qty: 90 TABLET | Refills: 0 | Status: SHIPPED | OUTPATIENT
Start: 2022-12-19 | End: 2023-03-17

## 2022-12-19 NOTE — TELEPHONE ENCOUNTER
Rx Refill Note  Requested Prescriptions     Pending Prescriptions Disp Refills   • losartan (COZAAR) 25 MG tablet [Pharmacy Med Name: LOSARTAN 25MG TABLETS] 90 tablet 0     Sig: TAKE 1 TABLET BY MOUTH DAILY     Signed Prescriptions Disp Refills   • furosemide (LASIX) 20 MG tablet 90 tablet 0     Sig: TAKE 1 TABLET BY MOUTH DAILY     Authorizing Provider: ARLETTE ROSE     Ordering User: CHELY CARMICHAEL      Last office visit with prescribing clinician: 6/7/2022   Last telemedicine visit with prescribing clinician: Visit date not found   Next office visit with prescribing clinician: 6/19/2023                         Would you like a call back once the refill request has been completed: [] Yes [] No    If the office needs to give you a call back, can they leave a voicemail: [] Yes [] No    Chely Carmichael LPN  12/19/22, 16:12 EST

## 2022-12-20 RX ORDER — LOSARTAN POTASSIUM 25 MG/1
25 TABLET ORAL DAILY
Qty: 90 TABLET | Refills: 1 | Status: SHIPPED | OUTPATIENT
Start: 2022-12-20

## 2022-12-27 RX ORDER — ESCITALOPRAM OXALATE 5 MG/1
5 TABLET ORAL DAILY
Qty: 90 TABLET | Refills: 0 | Status: SHIPPED | OUTPATIENT
Start: 2022-12-27

## 2022-12-27 NOTE — TELEPHONE ENCOUNTER
Rx Refill Note  Requested Prescriptions     Pending Prescriptions Disp Refills   • escitalopram (LEXAPRO) 5 MG tablet [Pharmacy Med Name: ESCITALOPRAM 5MG TABLETS] 90 tablet 0     Sig: TAKE 1 TABLET BY MOUTH DAILY      Last office visit with prescribing clinician: 6/7/2022   Last telemedicine visit with prescribing clinician: 6/19/2023   Next office visit with prescribing clinician: 6/19/2023                         Would you like a call back once the refill request has been completed: [] Yes [] No    If the office needs to give you a call back, can they leave a voicemail: [] Yes [] No    Stefani Starr Rep  12/27/22, 11:37 EST

## 2023-01-26 ENCOUNTER — OFFICE VISIT (OUTPATIENT)
Dept: FAMILY MEDICINE CLINIC | Facility: CLINIC | Age: 78
End: 2023-01-26
Payer: MEDICARE

## 2023-01-26 ENCOUNTER — HOSPITAL ENCOUNTER (OUTPATIENT)
Dept: GENERAL RADIOLOGY | Facility: HOSPITAL | Age: 78
Discharge: HOME | End: 2023-01-26
Admitting: FAMILY MEDICINE
Payer: MEDICARE

## 2023-01-26 VITALS
SYSTOLIC BLOOD PRESSURE: 130 MMHG | OXYGEN SATURATION: 98 % | RESPIRATION RATE: 18 BRPM | HEART RATE: 78 BPM | DIASTOLIC BLOOD PRESSURE: 86 MMHG | WEIGHT: 194 LBS | BODY MASS INDEX: 34.38 KG/M2 | TEMPERATURE: 97.8 F | HEIGHT: 63 IN

## 2023-01-26 DIAGNOSIS — M79.601 RIGHT ARM PAIN: ICD-10-CM
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
## 2023-01-26 PROBLEM — M25.511 ACUTE PAIN OF RIGHT SHOULDER: Status: ACTIVE | Noted: 2023-01-26

## 2023-01-26 PROCEDURE — 99214 OFFICE O/P EST MOD 30 MIN: CPT | Performed by: FAMILY MEDICINE

## 2023-01-26 PROCEDURE — 73030 X-RAY EXAM OF SHOULDER: CPT

## 2023-01-26 RX ORDER — TRAMADOL HYDROCHLORIDE 50 MG/1
50 TABLET ORAL EVERY 6 HOURS PRN
Qty: 30 TABLET | Refills: 0 | Status: SHIPPED | OUTPATIENT
Start: 2023-01-26 | End: 2023-02-22

## 2023-01-26 NOTE — ASSESSMENT & PLAN NOTE
- Order placed for x-ray of right arm.   - Prescription for tramadol 50 mg 1 tablet every 6 hours as needed for pain.  - Continue using Tylenol as needed for pain.  - Referring patient to orthopedics for consultation.

## 2023-01-26 NOTE — PROGRESS NOTES
"Chief Complaint  Chief Complaint   Patient presents with   • Fall     Pt had fall on 1/24 and is having pain in R arm          Subjective    History of Present Illness        Christine Quick presents to [facility] for   History of Present Illness  Fall resulting in right arm pain  The patient reports that on the evening of 01/24/2022, following dinner, she was walking to her family room with 2 glasses of liquid in her hands and somehow she fell flat on her face. She notes that she was trying to brace her right shoulder as she already suffers from arthritis in that shoulder which causes her pain. She adds that glass went everywhere and she was very surprised that she was not cut all over from that. Ever since that time she has been in excruciating pain on her right shoulder that radiates down her length of her right arm. She is unable to raise her right arm without assistance from her left arm. She has been taking Tylenol for her pain. She is also experiencing intermittent neck pain related to how she fell. She has also been using a heating pad to help mitigate some of her pain.           Objective   Vital Signs:   Visit Vitals  /86   Pulse 78   Temp 97.8 °F (36.6 °C)   Resp 18   Ht 160 cm (63\")   Wt 88 kg (194 lb)   LMP  (LMP Unknown)   SpO2 98%   BMI 34.37 kg/m²       BMI is >= 30 and <35. (Class 1 Obesity). The following options were offered after discussion;: weight loss educational material (shared in after visit summary)     Physical Exam  Vitals reviewed.   Constitutional:       Appearance: She is well-developed.   HENT:      Head: Normocephalic and atraumatic.      Nose: Nose normal.   Eyes:      General: Lids are normal.      Conjunctiva/sclera: Conjunctivae normal.      Pupils: Pupils are equal, round, and reactive to light.   Cardiovascular:      Rate and Rhythm: Normal rate and regular rhythm.      Pulses: Normal pulses.      Heart sounds: Normal heart sounds. "   Pulmonary:      Effort: Pulmonary effort is normal. No respiratory distress.      Breath sounds: Normal breath sounds.   Abdominal:      General: Bowel sounds are normal.      Palpations: Abdomen is soft.   Musculoskeletal:         General: Tenderness present. Normal range of motion.      Cervical back: Normal range of motion and neck supple.   Skin:     General: Skin is warm and dry.      Capillary Refill: Capillary refill takes less than 2 seconds.      Findings: Bruising present.   Neurological:      Mental Status: She is alert and oriented to person, place, and time.   Psychiatric:         Behavior: Behavior normal.         Thought Content: Thought content normal.         Judgment: Judgment normal.                Result Review :                      Assessment and Plan      Diagnoses and all orders for this visit:    1. Fall, initial encounter (Primary)    2. Right arm pain  Assessment & Plan:  - Order placed for x-ray of right arm.   - Prescription for tramadol 50 mg 1 tablet every 6 hours as needed for pain.  - Continue using Tylenol as needed for pain.  - Referring patient to orthopedics for consultation.    Orders:  -     XR Shoulder 2+ View Right  -     traMADol (ULTRAM) 50 MG tablet; Take 1 tablet by mouth Every 6 (Six) Hours As Needed for Moderate Pain.  Dispense: 30 tablet; Refill: 0  -     Cancel: MRI Shoulder Right Without Contrast; Future  -     Cancel: MRI humerus right wo contrast; Future           Follow Up   No follow-ups on file.  Patient was given instructions and counseling regarding her condition or for health maintenance advice. Please see specific information pulled into the AVS if appropriate.     Transcribed from ambient dictation for Donine Barton Sr, MD by Citlalli Isaac.  01/26/23   16:43 EST    Patient or patient representative verbalized consent to the visit recording.  I have personally performed the services described in this document as transcribed by the above individual, and it  is both accurate and complete.

## 2023-01-27 ENCOUNTER — TELEPHONE (OUTPATIENT)
Dept: FAMILY MEDICINE CLINIC | Facility: CLINIC | Age: 78
End: 2023-01-27
Payer: MEDICARE

## 2023-01-27 DIAGNOSIS — S42.201G CLOSED FRACTURE OF PROXIMAL END OF RIGHT HUMERUS WITH DELAYED HEALING, UNSPECIFIED FRACTURE MORPHOLOGY, SUBSEQUENT ENCOUNTER: Primary | ICD-10-CM

## 2023-01-27 NOTE — TELEPHONE ENCOUNTER
----- Message from Donnie Barton Sr., MD sent at 1/26/2023  4:59 PM EST -----  Patient fractured her right arm.  She will need a sling or shoulder immobilizer for the next 2 weeks.  We will send her to Ortho for further evaluation and treatment.

## 2023-01-30 ENCOUNTER — TELEPHONE (OUTPATIENT)
Dept: ORTHOPEDIC SURGERY | Facility: CLINIC | Age: 78
End: 2023-01-30
Payer: MEDICARE

## 2023-01-30 NOTE — TELEPHONE ENCOUNTER
INTERNAL REF- Closed fracture of proximal end of right humerus with delayed healing, unspecified fracture morphology, subsequent encounter- PROGNOTES 01/26/2023-XRAY RIGHT SHOULDER 01/26/2023- CAN DIGNA SEE SOON?

## 2023-02-08 ENCOUNTER — OFFICE VISIT (OUTPATIENT)
Dept: ORTHOPEDIC SURGERY | Facility: CLINIC | Age: 78
End: 2023-02-08
Payer: MEDICARE

## 2023-02-08 VITALS — BODY MASS INDEX: 33.63 KG/M2 | HEIGHT: 63 IN | TEMPERATURE: 97.1 F | WEIGHT: 189.8 LBS

## 2023-02-08 DIAGNOSIS — M25.511 RIGHT SHOULDER PAIN, UNSPECIFIED CHRONICITY: Primary | ICD-10-CM

## 2023-02-08 PROCEDURE — 99203 OFFICE O/P NEW LOW 30 MIN: CPT | Performed by: ORTHOPAEDIC SURGERY

## 2023-02-08 PROCEDURE — 23600 CLTX PROX HUMRL FX W/O MNPJ: CPT | Performed by: ORTHOPAEDIC SURGERY

## 2023-02-08 PROCEDURE — 73030 X-RAY EXAM OF SHOULDER: CPT | Performed by: ORTHOPAEDIC SURGERY

## 2023-02-08 RX ORDER — CLOBETASOL PROPIONATE 0.5 MG/G
CREAM TOPICAL
COMMUNITY
Start: 2022-12-18

## 2023-02-08 RX ORDER — TRAMADOL HYDROCHLORIDE 50 MG/1
50 TABLET ORAL EVERY 4 HOURS PRN
Qty: 60 TABLET | Refills: 0 | Status: SHIPPED | OUTPATIENT
Start: 2023-02-08

## 2023-02-08 NOTE — PROGRESS NOTES
History & Physical     Patient: Christine Quick    YOB: 1945    Medical Record Number: [id number]    Chief Complaints: Right shoulder injury    History of Present Illness: 77 y.o. female presents for evaluation of the right shoulder.  The injury was sustained about a week ago in a fall at home.  Describes the pain as moderate, constant, and aching.  The pain is worse with movement.  The pain is better with rest, tramadol and use of the sling.  Denies any other injuries or complaints.  She is right hand dominant.    Allergies   Allergen Reactions   • Amlodipine Swelling   • Egg White (Egg Protein) Rash           Current Outpatient Medications:   •  calcium carbonate-vitamin d 600-400 MG-UNIT per tablet, Take by mouth., Disp: , Rfl:   •  Cholecalciferol (VITAMIN D3) 3000 UNITS tablet, Take by mouth., Disp: , Rfl:   •  clobetasol (TEMOVATE) 0.05 % cream, APPLY TO AFFECTED AREA TWICE DAILY FOR PSORIASIS ON TRUNK AND EXTREMITIES, Disp: , Rfl:   •  desonide (DESOWEN) 0.05 % cream, As Needed., Disp: , Rfl: 2  •  dilTIAZem (TIAZAC) 240 MG 24 hr capsule, TAKE 1 CAPSULE BY MOUTH DAILY, Disp: 90 capsule, Rfl: 0  •  escitalopram (LEXAPRO) 5 MG tablet, TAKE 1 TABLET BY MOUTH DAILY, Disp: 90 tablet, Rfl: 0  •  fexofenadine (ALLEGRA) 60 MG tablet, Take 60 mg by mouth As Needed., Disp: , Rfl:   •  Flaxseed, Linseed, (FLAXSEED OIL) 1200 MG capsule, Take by mouth., Disp: , Rfl:   •  folic acid (FOLVITE) 1 MG tablet, Take 1 mg by mouth Daily., Disp: , Rfl:   •  furosemide (LASIX) 20 MG tablet, TAKE 1 TABLET BY MOUTH DAILY, Disp: 90 tablet, Rfl: 0  •  hydrocortisone 2.5 % cream, APPLY THIN LAYER TOPICALLY TO THE AFFECTED AREA TWICE DAILY, Disp: , Rfl:   •  ketoconazole (NIZORAL) 2 % cream, , Disp: , Rfl: 6  •  losartan (COZAAR) 25 MG tablet, TAKE 1 TABLET BY MOUTH DAILY, Disp: 90 tablet, Rfl: 1  •  Multiple Vitamin (MULTI VITAMIN DAILY PO), Take by mouth., Disp: , Rfl:   •  traMADol (ULTRAM) 50 MG tablet, Take 1 tablet  by mouth Every 6 (Six) Hours As Needed for Moderate Pain., Disp: 30 tablet, Rfl: 0  •  triamcinolone (KENALOG) 0.1 % cream, , Disp: , Rfl: 3    Past Medical History:   Diagnosis Date   • Allergic rhinitis    • Fibrocystic breast    • Herpes iris     right eye   • Herpes simplex    • History of bone density study    • History of herpes zoster    • History of mammogram    • History of Papanicolaou smear of cervix    • Hypertension    • Osteopenia    • Rheumatoid arthritis (HCC)    • Vitamin D deficiency           Past Surgical History:   Procedure Laterality Date   • BREAST EXCISIONAL BIOPSY Bilateral     40 years ago both benign   • COLONOSCOPY  2013    normal   • TUBAL ABDOMINAL LIGATION            Social History     Occupational History   • 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   • Occupation: retired teacher   Tobacco Use   • Smoking status: Never   • Smokeless tobacco: Never   Substance and Sexual Activity   • Alcohol use: Not Currently     Alcohol/week: 1.0 standard drink     Types: 1 Glasses of wine per week     Comment: special occasions   • Drug use: No   • Sexual activity: Yes     Partners: Male      Social History     Social History Narrative   • Not on file          Family History   Problem Relation Age of Onset   • Colon cancer Brother 58   • Kidney failure Brother    • Esophageal cancer Son    • Throat cancer Son    • Arthritis Other    • Osteopenia Other    • Anxiety disorder Mother    • Depression Mother    • Heart failure Mother    • Breast cancer Neg Hx    • Ovarian cancer Neg Hx        Review of Systems:      Constitutional: Denies fever, shaking or chills   Eyes: Denies change in visual acuity   HEENT: Denies nasal congestion or sore throat   Respiratory: Denies cough or shortness of breath   Cardiovascular: Denies chest pain or edema  Endocrine: Denies tremors, palpitations, intolerance of heat or cold, polyuria, polydipsia.  GI: Denies abdominal pain, nausea, vomiting, bloody stools or diarrhea  : Denies  "frequency, urgency, incontinence, retention, or nocturia.  Musculoskeletal: Denies numbness tingling or loss of motor function except as above  Integument: Denies rash, lesion or ulceration   Neurologic: Denies headache or focal weakness, deficits  Heme: Denies epistaxis, spontaneous or excessive bleeding, epistaxis, hematuria, melena, fatigue, enlarged or tender lymph nodes.      All other pertinent positives and negatives as noted above in HPI.    Physical Exam: 77 y.o. female    Vitals:    02/08/23 1016   Temp: 97.1 °F (36.2 °C)   Weight: 86.1 kg (189 lb 12.8 oz)   Height: 160 cm (63\")       General:  Patient is awake and alert.  Appears in no acute distress or discomfort.    Psych:  Affect and demeanor are appropriate.    Eyes:  Conjunctiva and sclera appear grossly normal.  Eyes track well and EOM seem to be intact.    Dentition:  No gross abnormalities noted.    Ears:  No gross abnormalities.  Hearing adequate for the exam.    Cardiovascular:  Regular rate and rhythm.    Lungs:  Good chest expansion.  Breathing unlabored.    Lymph:  No palpable masses or adenopathy in the affected extremity    Right upper extremity:  Sling was in place and removed.  Skin appears benign.  No gross malalignment, lacerations or abrasions.  Focal tenderness noted diffusely about the shoulder.  No palpable masses or adenopathy.  Compartments soft.  Painful, limited ROM of the shoulder.  Could not assess stability.  No tenderness at the elbow, forearm, wrist or hand.  Good strength in the wrist with flexion and extension as well as , pinch, finger and thumb abduction strength.  Intact sensation.  Brisk cap refill.  Palpable radial pulse.      Diagnostic Tests:  Lab Results   Component Value Date    GLUCOSE 98 10/18/2022    CALCIUM 9.4 10/18/2022     10/18/2022    K 3.8 10/18/2022    CO2 30.5 (H) 10/18/2022     10/18/2022    BUN 13 10/18/2022    CREATININE 0.71 10/18/2022    EGFRIFAFRI 100 06/04/2021    EGFRIFNONA 83 " 06/04/2021    BCR 18.3 10/18/2022    ANIONGAP 5.5 10/18/2022     Lab Results   Component Value Date    WBC 3.32 (L) 06/04/2021    HGB 13.3 06/04/2021    HCT 40.3 06/04/2021    MCV 99.8 (H) 06/04/2021     06/04/2021     No results found for: INR, PROTIME    Imaging:  Outside AP and orthogonal views of the right shoulder are reviewed.  No comparison films are available.  The x-rays show a valgus impacted 3 part fracture.    Assessment:  Right proximal humerus fracture    Plan: We had a thorough discussion regarding the risks of non-surgical management versus surgery.  I explained that there is good evidence that these valgus impacted fractures can heal with conservative treatment.  With closed treatment, there is the risk of further displacement, malunion, nonunion or persistent pain or loss of motion/function that could require further intervention in the future.  I consider that this injury is amenable to closed treatment.  She voiced understanding of the risks, benefits, and alternative forms of treatment that were discussed.  She stated that she absolutely wants to avoid surgery unless absolutely necessary.  She consents to proceed with closed treatment.  She was instructed to continue use of the sling for comfort.  We will need to reconvene in 2 weeks for repeat x-rays and reevaluation.  All questions posed by her and her  were answered in detail.  I did agree to refill her tramadol.  I also fitted her with a more comfortable sling today.  I will see her back in 2 weeks.    Date: 2/8/2023    Scott Llamas MD    CC to Tamiko Frederick MD

## 2023-02-13 ENCOUNTER — TELEPHONE (OUTPATIENT)
Dept: ORTHOPEDIC SURGERY | Facility: CLINIC | Age: 78
End: 2023-02-13
Payer: MEDICARE

## 2023-02-13 NOTE — TELEPHONE ENCOUNTER
THE CHART SAYS RT SHOULDER INJURY BUT IN THE DESCRIPTION IS SAYS LEFT SHOULDER. MARK TOLD ME TO SEND A MESSAGE FOR AN ADDENDUM FOR THE 2/8/23 VISIT

## 2023-02-22 ENCOUNTER — OFFICE VISIT (OUTPATIENT)
Dept: ORTHOPEDIC SURGERY | Facility: CLINIC | Age: 78
End: 2023-02-22
Payer: MEDICARE

## 2023-02-22 VITALS — WEIGHT: 182.3 LBS | BODY MASS INDEX: 32.3 KG/M2 | TEMPERATURE: 97.3 F | HEIGHT: 63 IN

## 2023-02-22 DIAGNOSIS — S42.90XA CLOSED FRACTURE OF SHOULDER, UNSPECIFIED LATERALITY, INITIAL ENCOUNTER: ICD-10-CM

## 2023-02-22 DIAGNOSIS — M25.511 RIGHT SHOULDER PAIN, UNSPECIFIED CHRONICITY: Primary | ICD-10-CM

## 2023-02-22 DIAGNOSIS — Z09 FRACTURE FOLLOW-UP: ICD-10-CM

## 2023-02-22 PROCEDURE — 73030 X-RAY EXAM OF SHOULDER: CPT | Performed by: ORTHOPAEDIC SURGERY

## 2023-02-22 PROCEDURE — 99024 POSTOP FOLLOW-UP VISIT: CPT | Performed by: ORTHOPAEDIC SURGERY

## 2023-02-22 NOTE — PROGRESS NOTES
Christine Quick : 1945 MRN: [id number] DATE: 2023    CC: Closed treatment of right proximal humerus fracture    HPI: Patient returns to clinic today stating pain is improved.  Denies any new concerns or issues.    Vitals:    23 1320   Temp: 97.3 °F (36.3 °C)       Exam:  Skin intact and benign.  Arm and forearm are both soft.  Shoulder moves fluidly with pendulum exercises.  Good motor and sensory function distally.  Palpable radial pulse with good cap refill.      Imaginv xrays including AP and scapular Y are ordered and reviewed by me to evaluate alignment and for comparison purposes.  No new or concerning findings noted. Fracture appears in unchanged alignment.  There is some early callous formation.    Impression:   4 weeks s/p closed treatment of right proximal humerus fracture    Plan:    1.  Discontinue use of sling.  2.  Begin working on ROM.  Start formal PT  3.  Follow-up in 4 weeks with repeat 2v xrays.  4.  Counseled the patient about appropriate activity modifications and restrictions, including no driving.    Scott Llamas MD

## 2023-03-08 ENCOUNTER — TREATMENT (OUTPATIENT)
Dept: PHYSICAL THERAPY | Facility: CLINIC | Age: 78
End: 2023-03-08
Payer: MEDICARE

## 2023-03-08 DIAGNOSIS — M25.511 ACUTE PAIN OF RIGHT SHOULDER: Primary | ICD-10-CM

## 2023-03-08 DIAGNOSIS — R26.9 GAIT DISTURBANCE: ICD-10-CM

## 2023-03-08 DIAGNOSIS — S42.291S HUMERUS HEAD FRACTURE, RIGHT, SEQUELA: ICD-10-CM

## 2023-03-08 DIAGNOSIS — F40.298 FEAR OF FALLING: ICD-10-CM

## 2023-03-08 PROCEDURE — 97530 THERAPEUTIC ACTIVITIES: CPT | Performed by: PHYSICAL THERAPIST

## 2023-03-08 PROCEDURE — 97162 PT EVAL MOD COMPLEX 30 MIN: CPT | Performed by: PHYSICAL THERAPIST

## 2023-03-08 NOTE — PROGRESS NOTES
Physical Therapy Initial Evaluation and Plan of Care    Patient: Christine Quick   : 1945  Diagnosis/ICD-10 Code:  Acute pain of right shoulder [M25.511]  Referring practitioner: Scott Llamas MD  Past medical Hx reviewed: 3/8/2023  Subjective Evaluation    History of Present Illness  Onset date: .  Mechanism of injury: While walking at home I tripped on a transition to hardwood floor and stubbed my toe and fell down.  I tried to break my fall and I fell forward onto my R shoulder forward.  I did not suffer any head or neck injury.  I went to the doctor 2 days later because the arm hurt still.  They took X-rays and found the fracture at my orthopedic doctor's office a few days after.    I was placed in a sling for about 3-4 weeks and I have been discharged from wearing it a couple of weeks ago.    Currently, I still have pain in the ball of the shoulder expecially when I reach back as to tuck a shirt, put on a seat belt or comb hair.  Sleeping on the R and reaching overhead is still limited.  I have been wearing lose clothes because there are easier to put on.  Some grooming is still challenging but I can do it.  My doctor recommended that I not resume driving until after PT.  I can carry my purse and lift it fairly comfortably.  It's getting better.    I do have some reluctance with my walking and my balance due to my fall.        Patient Occupation: Cleaning service light duty work, household things.  Returned last week.   Quality of life: good    Pain  Current pain ratin  At best pain ratin  At worst pain ratin (Reaching too quikly above head and getting seat belt on)  Location: R lateral shoulder and anterior.    Quality: sharp, tight and dull ache  Relieving factors: medications, heat and rest (Tylenol PRN )  Aggravating factors: sleeping, outstretched reach, repetitive movement, overhead activity, lifting and movement    Social Support  Lives in: one-story house  (basement.  No stairs to enter)  Lives with: spouse    Hand dominance: right    Diagnostic Tests  X-ray: abnormal    Patient Goals  Patient goals for therapy: decreased pain, increased strength, independence with ADLs/IADLs and return to sport/leisure activities  Patient goal: Improve balance.      PLOF: Hx of balance issues and have used cane /walker in past couple of months since the fall.      Objective          Palpation     Right   Hypertonic in the pectoralis major, pectoralis minor and upper trapezius. Tenderness of the biceps and pectoralis minor.     Tenderness     Right Shoulder  Tenderness in the biceps tendon (proximal), subscapularis tendon and supraspinatus tendon. No tenderness in the acromion, clavicle and coracoid process.     Cervical/Thoracic Screen   Cervical range of motion within normal limits with the following exceptions: WFL all planes without pain reported.       Active Range of Motion   Left Shoulder   Flexion: WFL  Adduction: WFL  External rotation 0°: WFL  Internal rotation 0°: WFL    Right Shoulder   Flexion: 80 degrees with pain  Abduction: 85 degrees with pain  External rotation 0°: 45 degrees   External rotation 45°: 75 (In Supine) degrees   Internal rotation 0°: 70 degrees     Passive Range of Motion     Right Shoulder   Flexion: 165 degrees   Abduction: 145 degrees   External rotation 0°: 75 degrees   External rotation 45°: 80 (In supine) degrees     Strength/Myotome Testing     Right Shoulder     Planes of Motion   Flexion: 4- (pain)   Extension: 4+   Abduction: 3+ (pain )   Adduction: 4+   External rotation at 0°: 3+ (pain)   Internal rotation at 0°: 4+     Ambulation     Observational Gait   Decreased walking speed and stride length.   Base of support: increased    Functional Assessment     Comments  TU.73 Sec. CGAx 1   Sternal nudge: No LOB.   Feet together:  30 sec, Eyes closed: 25 sec with 2 attempts   Tandem stance:  3 sec, 7 sec. (UE assist to set up)      Assessment  & Plan     Assessment  Impairments: abnormal muscle firing, abnormal or restricted ROM, activity intolerance, impaired physical strength, lacks appropriate home exercise program and pain with function  Functional Limitations: carrying objects, lifting, sleeping, pushing, uncomfortable because of pain, reaching behind back, reaching overhead and unable to perform repetitive tasks  Assessment details: Pt presents to PT with symptoms consistent with R shoulder dysfunction secondary to proximal humerus fracture.  Of note, PROM is WFL with minimal pain at end ranges but active range limitations indicate soft tissue damage and weakness.  We will monitor her improvement closely and if improvements are slow, she may be referred for MRI to rule out RTC tear.    Additionally, she does exhibit notable fear avoidance behaviors in her gait and limited balance reactions.  She would benefit from gait and balance training to reduce recurrent fall risk and return to PLOF.    Pt would benefit from skilled PT intervention to address the deficits noted with the R shoulder.   Prognosis: good    Goals  Plan Goals: SHORT TERM GOALS: 4 weeks  1. Patient to be compliant with HEP and less difficulty with sleeping, waking <2x.  2. Increased (R) UE strength to 4/5 with no pain> 3/10 to allow for household and work activities.   3. Pt to exhibit (R) shoulder active flexion / ABD to 125° in standing/sitting to assist with reaching overhead with less pain to wash/style hair and change shirt.  4. Pt demonstrates improved static balance in tandem to at least 15 sec. For improved safety and no need for cane with community distances.   5.  Pt to safely return to driving and securing seat belt without increased pain.      LONG TERM GOALS: 8 weeks  1. Pt score <10% perceived disability on Quick DASH.  2. Pt. to exhibit (R) shoulder AROM to WFL (> 165°) flex/abd. to allow for reaching overhead and behind back without pain limiting function to perform  dressing and reach higher shelves.  3. Pt to exhibit 4+/5 UE strength to allow for pushing/pulling and lifting >5 #to occur with pain <2/10 to help with household cleaning, grocery shopping and recreational activities.   4. Pt able to reach overhead and lift 10# (B) x 10 to allow for return to doing work around home and help improve ability to return to exercise without restriction.   5.  TUG test < 12 sec, tandem stance 30 sec and SLS at least 5 sec for improved safety with walking, change of direction and uneven terrain.        Plan  Therapy options: will be seen for skilled therapy services  Planned modality interventions: cryotherapy, electrical stimulation/Russian stimulation, TENS, thermotherapy (hydrocollator packs), ultrasound and dry needling  Planned therapy interventions: ADL retraining, flexibility, body mechanics training, home exercise program, functional ROM exercises, joint mobilization, manual therapy, neuromuscular re-education, postural training, soft tissue mobilization, spinal/joint mobilization, strengthening, stretching and therapeutic activities  Frequency: 2x week  Duration in weeks: 8  Treatment plan discussed with: patient      Manual Therapy:    -     mins  87099;  Therapeutic Exercise:    -     mins  74643;     Neuromuscular Prashanth:    -    mins  72684;    Therapeutic Activity:     10     mins  77280;     Gait Training:      -     mins  51511;     Ultrasound:     -     mins  48038;    Electrical Stimulation:    --     mins  06540 ( );  Dry Needling     -     mins self-pay    Timed Treatment:   10   mins   Total Treatment:     60   mins    PT SIGNATURE:  Joseluis Armendariz DPT, PT     SUSAN Acosta License #: [id number]    DATE TREATMENT INITIATED: 3/9/2023    Medicare Initial Certification  Certification Period: 6/7/2023  I certify that the therapy services are furnished while this patient is under my care.  The services outlined above are required by this patient, and will be  reviewed every 90 days.     PHYSICIAN: Scott Llamas MD      DATE:     Please sign and return via fax to [phone].. Thank you, [facility].

## 2023-03-10 ENCOUNTER — TREATMENT (OUTPATIENT)
Dept: PHYSICAL THERAPY | Facility: CLINIC | Age: 78
End: 2023-03-10
Payer: MEDICARE

## 2023-03-10 DIAGNOSIS — R26.9 GAIT DISTURBANCE: ICD-10-CM

## 2023-03-10 DIAGNOSIS — F40.298 FEAR OF FALLING: ICD-10-CM

## 2023-03-10 DIAGNOSIS — S42.291S HUMERUS HEAD FRACTURE, RIGHT, SEQUELA: ICD-10-CM

## 2023-03-10 DIAGNOSIS — M25.511 ACUTE PAIN OF RIGHT SHOULDER: Primary | ICD-10-CM

## 2023-03-10 PROCEDURE — 97116 GAIT TRAINING THERAPY: CPT | Performed by: PHYSICAL THERAPIST

## 2023-03-10 PROCEDURE — 97110 THERAPEUTIC EXERCISES: CPT | Performed by: PHYSICAL THERAPIST

## 2023-03-10 NOTE — PROGRESS NOTES
Physical Therapy Daily Treatment Note      Patient: Christine Quick   : 1945  Referring practitioner: Scott Llamas MD  Date of Initial Visit: Type: THERAPY  Noted: 3/8/2023  Today's Date: 3/10/2023  Patient seen for 2 sessions       Visit Diagnoses:    ICD-10-CM ICD-9-CM   1. Acute pain of right shoulder  M25.511 719.41   2. Humerus head fracture, right, sequela  S42.291S 905.2   3. Gait disturbance  R26.9 781.2   4. Fear of falling  F40.298 300.29     Subjective I looked in my closet and I was able to find some old walking shoes.  I do feel more steady walking with them on.  The mesh top doesn't give me as much support as I would like though.      Objective   See Exercise, Manual, and Modality Logs for complete treatment.     Assessment/Plan Pt did demostrate difficulty repeating shoulder AAROM within the receommended parameters without consistent cues.  Gait training added today to help with balance reactions and confidence.  CGA x for gait.      Progress per Plan of Care and Progress strengthening /stabilization /functional activity    Timed:         Manual Therapy:    -     mins  49437;     Therapeutic Exercise:    35     mins  01762;     Neuromuscular Prashanth:    -    mins  13789;    Therapeutic Activity:     -     mins  11244;     Gait Training:      10     mins  78522;     Ultrasound:     -     mins  33026;    Electrical Stimulation:    -     mins  17525 ( );  Dry Needling     -     mins self-pay  Traction     -     mins 24083      Timed Treatment:   45   mins   Total Treatment:     45   mins    SUSAN Acosta License: [id number]

## 2023-03-13 ENCOUNTER — TREATMENT (OUTPATIENT)
Dept: PHYSICAL THERAPY | Facility: CLINIC | Age: 78
End: 2023-03-13
Payer: MEDICARE

## 2023-03-13 DIAGNOSIS — M25.511 ACUTE PAIN OF RIGHT SHOULDER: Primary | ICD-10-CM

## 2023-03-13 DIAGNOSIS — S42.291S HUMERUS HEAD FRACTURE, RIGHT, SEQUELA: ICD-10-CM

## 2023-03-13 PROCEDURE — 97110 THERAPEUTIC EXERCISES: CPT | Performed by: PHYSICAL THERAPIST

## 2023-03-13 NOTE — PROGRESS NOTES
Physical Therapy Daily Treatment Note      Patient: Christine Quick   : 1945  Referring practitioner: Scott Llamas MD  Date of Initial Visit: Type: THERAPY  Noted: 3/8/2023  Today's Date: 3/13/2023  Patient seen for 3 sessions       Visit Diagnoses:    ICD-10-CM ICD-9-CM   1. Acute pain of right shoulder  M25.511 719.41   2. Humerus head fracture, right, sequela  S42.291S 905.2     Subjective   I am feeling better today than last time, I feel like my arm is moving more freely.    Objective   See Exercise, Manual, and Modality Logs for complete treatment.     Assessment/Plan   Pt demos significant improvement in PROM and AROM (R) shoulder vs status at [facility].  We discussed importance of proper fitting footwear as an aid for confidence with balance.       Progress per Plan of Care and Progress strengthening /stabilization /functional activity, progress with balance activity and shoulder stabilization/strength as enedina.     Timed:         Manual Therapy:    5     mins  66759;     Therapeutic Exercise:    35     mins  45233;     Neuromuscular Prashanth:    -    mins  51224;    Therapeutic Activity:     -     mins  91712;     Gait Training:      -     mins  31345;     Ultrasound:     -     mins  70235;    Electrical Stimulation:    -     mins  73755 ( );  Dry Needling     -     mins self-pay  Traction     -     mins 72154      Timed Treatment:   40   mins   Total Treatment:     40   mins      JAILENE Carbajal License #[id number]  Physical Therapist Assistant

## 2023-03-15 ENCOUNTER — TELEPHONE (OUTPATIENT)
Dept: PHYSICAL THERAPY | Facility: CLINIC | Age: 78
End: 2023-03-15

## 2023-03-16 NOTE — TELEPHONE ENCOUNTER
Rx Refill Note  Requested Prescriptions     Pending Prescriptions Disp Refills   • furosemide (LASIX) 20 MG tablet [Pharmacy Med Name: FUROSEMIDE 20MG TABLETS] 90 tablet 0     Sig: TAKE 1 TABLET BY MOUTH DAILY      Last office visit with prescribing clinician: 6/7/2022   Last telemedicine visit with prescribing clinician: Visit date not found   Next office visit with prescribing clinician: Visit date not found                         Would you like a call back once the refill request has been completed: [] Yes [] No    If the office needs to give you a call back, can they leave a voicemail: [] Yes [] No    Chely Gibbs LPN  03/16/23, 15:14 EDT

## 2023-03-17 RX ORDER — FUROSEMIDE 20 MG/1
20 TABLET ORAL DAILY
Qty: 30 TABLET | Refills: 0 | Status: SHIPPED | OUTPATIENT
Start: 2023-03-17

## 2023-03-20 ENCOUNTER — OFFICE VISIT (OUTPATIENT)
Dept: ORTHOPEDIC SURGERY | Facility: CLINIC | Age: 78
End: 2023-03-20
Payer: MEDICARE

## 2023-03-20 VITALS — HEIGHT: 64 IN | WEIGHT: 175.8 LBS | BODY MASS INDEX: 30.01 KG/M2 | TEMPERATURE: 97.8 F

## 2023-03-20 DIAGNOSIS — M25.511 RIGHT SHOULDER PAIN, UNSPECIFIED CHRONICITY: Primary | ICD-10-CM

## 2023-03-20 DIAGNOSIS — S42.90XA CLOSED FRACTURE OF SHOULDER, UNSPECIFIED LATERALITY, INITIAL ENCOUNTER: ICD-10-CM

## 2023-03-20 PROCEDURE — 1159F MED LIST DOCD IN RCRD: CPT | Performed by: ORTHOPAEDIC SURGERY

## 2023-03-20 PROCEDURE — 99024 POSTOP FOLLOW-UP VISIT: CPT | Performed by: ORTHOPAEDIC SURGERY

## 2023-03-20 PROCEDURE — 1160F RVW MEDS BY RX/DR IN RCRD: CPT | Performed by: ORTHOPAEDIC SURGERY

## 2023-03-20 PROCEDURE — 73030 X-RAY EXAM OF SHOULDER: CPT | Performed by: ORTHOPAEDIC SURGERY

## 2023-03-20 NOTE — PROGRESS NOTES
"Christine Quick : 1945 MRN: [id number] DATE: 3/20/2023    CC:  22 months s/p closed treatment right proximal humerus fracture    HPI: Pt. returns to clinic today stating pain is improved.  Motion is progressing.  Denies any new concerns or issues.  PT is helping.    Exam:    Vitals:    23 1602   Temp: 97.8 °F (36.6 °C)   TempSrc: Temporal   Weight: 79.7 kg (175 lb 12.8 oz)   Height: 162.6 cm (64\")       Contour of shoulder appears normal.  Skin intact and benign.  Arm and forearm soft.  Motion is much better although not quite full relative to the contralateral side.  Good motor and sensory function distally.  Palpable radial pulse with good cap refill.      Imaginv xrays including AP, scapular Y views of the shoulder are ordered and reviewed by me to evaluate alignment and for comparison purposes.  No new or concerning findings noted. There has been interval callous formation.    Impression:  2 months s/p closed treatment right proximal humerus fracture    Plan:    1.  Progress ROM and strengthening as tolerated.  2.  Continue PT.  3.  No restrictions at this point.  4.  Follow up in 6 weeks    Scott Llamas MD    2023   "

## 2023-05-08 RX ORDER — FUROSEMIDE 20 MG/1
20 TABLET ORAL DAILY
Qty: 30 TABLET | Refills: 0 | Status: SHIPPED | OUTPATIENT
Start: 2023-05-08

## 2023-07-28 ENCOUNTER — OFFICE VISIT (OUTPATIENT)
Dept: FAMILY MEDICINE CLINIC | Facility: CLINIC | Age: 78
End: 2023-07-28
Payer: MEDICARE

## 2023-07-28 VITALS
SYSTOLIC BLOOD PRESSURE: 140 MMHG | RESPIRATION RATE: 18 BRPM | DIASTOLIC BLOOD PRESSURE: 80 MMHG | HEIGHT: 64 IN | TEMPERATURE: 96.9 F | OXYGEN SATURATION: 97 % | WEIGHT: 192.8 LBS | HEART RATE: 55 BPM | BODY MASS INDEX: 32.91 KG/M2

## 2023-07-28 DIAGNOSIS — N39.41 URGE INCONTINENCE: ICD-10-CM

## 2023-07-28 DIAGNOSIS — Z12.31 ENCOUNTER FOR SCREENING MAMMOGRAM FOR MALIGNANT NEOPLASM OF BREAST: ICD-10-CM

## 2023-07-28 DIAGNOSIS — Z13.29 SCREENING FOR THYROID DISORDER: ICD-10-CM

## 2023-07-28 DIAGNOSIS — Z13.220 SCREENING FOR LIPOID DISORDERS: ICD-10-CM

## 2023-07-28 DIAGNOSIS — Z00.00 MEDICARE ANNUAL WELLNESS VISIT, SUBSEQUENT: Primary | ICD-10-CM

## 2023-07-28 DIAGNOSIS — Z12.11 SCREENING FOR COLON CANCER: ICD-10-CM

## 2023-07-28 LAB
ALBUMIN SERPL-MCNC: 4.1 G/DL (ref 3.5–5.2)
ALBUMIN/GLOB SERPL: 1.9 G/DL
ALP SERPL-CCNC: 98 U/L (ref 39–117)
ALT SERPL-CCNC: 21 U/L (ref 1–33)
AST SERPL-CCNC: 24 U/L (ref 1–32)
BASOPHILS # BLD AUTO: 0.03 10*3/MM3 (ref 0–0.2)
BASOPHILS NFR BLD AUTO: 1.1 % (ref 0–1.5)
BILIRUB SERPL-MCNC: 0.3 MG/DL (ref 0–1.2)
BUN SERPL-MCNC: 19 MG/DL (ref 8–23)
BUN/CREAT SERPL: 26.4 (ref 7–25)
CALCIUM SERPL-MCNC: 9.3 MG/DL (ref 8.6–10.5)
CHLORIDE SERPL-SCNC: 105 MMOL/L (ref 98–107)
CHOLEST SERPL-MCNC: 179 MG/DL (ref 0–200)
CO2 SERPL-SCNC: 30.3 MMOL/L (ref 22–29)
CREAT SERPL-MCNC: 0.72 MG/DL (ref 0.57–1)
EGFRCR SERPLBLD CKD-EPI 2021: 85.7 ML/MIN/1.73
EOSINOPHIL # BLD AUTO: 0.05 10*3/MM3 (ref 0–0.4)
EOSINOPHIL NFR BLD AUTO: 1.8 % (ref 0.3–6.2)
ERYTHROCYTE [DISTWIDTH] IN BLOOD BY AUTOMATED COUNT: 11.9 % (ref 12.3–15.4)
GLOBULIN SER CALC-MCNC: 2.2 GM/DL
GLUCOSE SERPL-MCNC: 86 MG/DL (ref 65–99)
HCT VFR BLD AUTO: 39.1 % (ref 34–46.6)
HDLC SERPL-MCNC: 84 MG/DL (ref 40–60)
HGB BLD-MCNC: 12.9 G/DL (ref 12–15.9)
IMM GRANULOCYTES # BLD AUTO: 0.01 10*3/MM3 (ref 0–0.05)
IMM GRANULOCYTES NFR BLD AUTO: 0.4 % (ref 0–0.5)
LDLC SERPL CALC-MCNC: 87 MG/DL (ref 0–100)
LDLC/HDLC SERPL: 1.04 {RATIO}
LYMPHOCYTES # BLD AUTO: 1.02 10*3/MM3 (ref 0.7–3.1)
LYMPHOCYTES NFR BLD AUTO: 37.1 % (ref 19.6–45.3)
MCH RBC QN AUTO: 31.3 PG (ref 26.6–33)
MCHC RBC AUTO-ENTMCNC: 33 G/DL (ref 31.5–35.7)
MCV RBC AUTO: 94.9 FL (ref 79–97)
MONOCYTES # BLD AUTO: 0.35 10*3/MM3 (ref 0.1–0.9)
MONOCYTES NFR BLD AUTO: 12.7 % (ref 5–12)
NEUTROPHILS # BLD AUTO: 1.29 10*3/MM3 (ref 1.7–7)
NEUTROPHILS NFR BLD AUTO: 46.9 % (ref 42.7–76)
NRBC BLD AUTO-RTO: 0 /100 WBC (ref 0–0.2)
PLATELET # BLD AUTO: 124 10*3/MM3 (ref 140–450)
POTASSIUM SERPL-SCNC: 4 MMOL/L (ref 3.5–5.2)
PROT SERPL-MCNC: 6.3 G/DL (ref 6–8.5)
RBC # BLD AUTO: 4.12 10*6/MM3 (ref 3.77–5.28)
SODIUM SERPL-SCNC: 144 MMOL/L (ref 136–145)
TRIGL SERPL-MCNC: 40 MG/DL (ref 0–150)
TSH SERPL DL<=0.005 MIU/L-ACNC: 2.43 UIU/ML (ref 0.27–4.2)
VLDLC SERPL CALC-MCNC: 8 MG/DL (ref 5–40)
WBC # BLD AUTO: 2.75 10*3/MM3 (ref 3.4–10.8)

## 2023-07-28 PROCEDURE — G0439 PPPS, SUBSEQ VISIT: HCPCS | Performed by: NURSE PRACTITIONER

## 2023-07-28 PROCEDURE — 3079F DIAST BP 80-89 MM HG: CPT | Performed by: NURSE PRACTITIONER

## 2023-07-28 PROCEDURE — 3077F SYST BP >= 140 MM HG: CPT | Performed by: NURSE PRACTITIONER

## 2023-07-28 RX ORDER — HYDROCHLOROTHIAZIDE 25 MG/1
25 TABLET ORAL DAILY
Qty: 90 TABLET | Refills: 1 | Status: SHIPPED | OUTPATIENT
Start: 2023-07-28

## 2023-07-28 RX ORDER — LOSARTAN POTASSIUM 25 MG/1
25 TABLET ORAL DAILY
Qty: 90 TABLET | Refills: 1 | Status: SHIPPED | OUTPATIENT
Start: 2023-07-28

## 2023-08-28 ENCOUNTER — TELEPHONE (OUTPATIENT)
Dept: FAMILY MEDICINE CLINIC | Facility: CLINIC | Age: 78
End: 2023-08-28

## 2023-08-28 NOTE — TELEPHONE ENCOUNTER
Caller: Christine Quick    Relationship to patient: Self    Best call back number: [phone]     Patient is needing: PLEASE CANCEL PATIENTS LABS THAT ARE SCHEDULED FOR THIS FRIDAY, SHE WILL CALL BACK TO RESCHEDULE.

## 2023-09-06 ENCOUNTER — OFFICE VISIT (OUTPATIENT)
Dept: FAMILY MEDICINE CLINIC | Facility: CLINIC | Age: 78
End: 2023-09-06
Payer: MEDICARE

## 2023-09-06 VITALS
OXYGEN SATURATION: 97 % | WEIGHT: 193.5 LBS | TEMPERATURE: 97.5 F | SYSTOLIC BLOOD PRESSURE: 144 MMHG | HEART RATE: 61 BPM | BODY MASS INDEX: 33.03 KG/M2 | RESPIRATION RATE: 18 BRPM | HEIGHT: 64 IN | DIASTOLIC BLOOD PRESSURE: 84 MMHG

## 2023-09-06 DIAGNOSIS — F41.9 ANXIETY: ICD-10-CM

## 2023-09-06 DIAGNOSIS — N39.3 STRESS INCONTINENCE: Primary | ICD-10-CM

## 2023-09-06 PROCEDURE — 99214 OFFICE O/P EST MOD 30 MIN: CPT | Performed by: NURSE PRACTITIONER

## 2023-09-06 PROCEDURE — 3079F DIAST BP 80-89 MM HG: CPT | Performed by: NURSE PRACTITIONER

## 2023-09-06 PROCEDURE — 3077F SYST BP >= 140 MM HG: CPT | Performed by: NURSE PRACTITIONER

## 2023-09-06 RX ORDER — DULOXETIN HYDROCHLORIDE 20 MG/1
20 CAPSULE, DELAYED RELEASE ORAL DAILY
Qty: 30 CAPSULE | Refills: 1 | Status: SHIPPED | OUTPATIENT
Start: 2023-09-06

## 2023-09-06 NOTE — PROGRESS NOTES
"Chief Complaint  Bladder Problem (Patient stated when she has to go she has to go now. It has got worse, like no control. Options?)    Subjective        Christine Quick presents to [facility]  History of Present Illness  Christine Quick is a 78-year-old female who presents today for urinary symptoms.    The patient reports Dr. Frederick prescribed her an anxiety tablet; however, she never took it. She inquiries about the advantages and disadvantages of taking Cymbalta. She states she eats a lot of vegetables. She notes she will have to increase her water intake. She states sometimes when she is fidgety, she must go urinate. She is having increased urinary symptoms and urgency which is causing concerns.      Objective   Vital Signs:  /84 (BP Location: Right arm, Patient Position: Sitting, Cuff Size: Adult)   Pulse 61   Temp 97.5 °F (36.4 °C) (Temporal)   Resp 18   Ht 162.6 cm (64.02\")   Wt 87.8 kg (193 lb 8 oz)   SpO2 97%   BMI 33.20 kg/m²   Estimated body mass index is 33.2 kg/m² as calculated from the following:    Height as of this encounter: 162.6 cm (64.02\").    Weight as of this encounter: 87.8 kg (193 lb 8 oz).     A review of systems was performed, and the pertinent positives are noted in the HPI.            Physical Exam  Vitals reviewed.   Constitutional:       Appearance: Normal appearance.   Cardiovascular:      Rate and Rhythm: Normal rate and regular rhythm.      Heart sounds: No murmur heard.    No friction rub. No gallop.   Pulmonary:      Effort: Pulmonary effort is normal. No respiratory distress.      Breath sounds: Normal breath sounds. No wheezing, rhonchi or rales.   Skin:     General: Skin is warm and dry.   Neurological:      Mental Status: She is alert and oriented to person, place, and time.      /84 (BP Location: Right arm, Patient Position: Sitting, Cuff Size: Adult)   Pulse 61   Temp 97.5 °F (36.4 °C) (Temporal)   Resp 18   Ht 162.6 cm (64.02\")  "  Wt 87.8 kg (193 lb 8 oz)   LMP  (LMP Unknown)   SpO2 97%   BMI 33.20 kg/m²        Result Review :                   Assessment and Plan   There are no diagnoses linked to this encounter.  Assessment & Plan     1. Stress incontinence of urine (Primary)   - Reviewed and discussed potential side effects of the medication. Prescribed the patient Duloxetine (CYMBALTA) 20 MG DR capsule; Take 1 capsule by mouth Daily.      2. Anxiety   - Prescribed the patient Duloxetine (CYMBALTA) 20 mg DR capsule; Take 1 capsule by mouth 2 (Two) Times a Day.     3. Essential hypertension   She will continue her current medications now.     Patient with increased anxiety and stress incontinence, discussed trial cymbalta as this may benefit with both symptoms, can always increase to twice daily if needed. Discussed hctz is likely contributing to urgency, Bp /84. We will see how she does on cymbalta and adjust if needed.     She will follow-up in 6 weeks and let me know if she is tolerating the medication and if we need to make any adjustments at that time.        Follow Up   Return in about 6 weeks (around 10/18/2023).  Patient was given instructions and counseling regarding her condition or for health maintenance advice. Please see specific information pulled into the AVS if appropriate.         Transcribed from ambient dictation for ELOISA Salgado by Frances River.  09/06/23   08:50 EDT    Patient or patient representative verbalized consent to the visit recording.  I have personally performed the services described in this document as transcribed by the above individual, and it is both accurate and complete.

## 2023-10-31 ENCOUNTER — OFFICE VISIT (OUTPATIENT)
Dept: FAMILY MEDICINE CLINIC | Facility: CLINIC | Age: 78
End: 2023-10-31
Payer: MEDICARE

## 2023-10-31 VITALS
HEIGHT: 64 IN | TEMPERATURE: 97.1 F | BODY MASS INDEX: 32.52 KG/M2 | RESPIRATION RATE: 18 BRPM | WEIGHT: 190.5 LBS | SYSTOLIC BLOOD PRESSURE: 138 MMHG | HEART RATE: 62 BPM | OXYGEN SATURATION: 95 % | DIASTOLIC BLOOD PRESSURE: 80 MMHG

## 2023-10-31 DIAGNOSIS — J06.9 ACUTE URI: Primary | ICD-10-CM

## 2023-10-31 LAB
EXPIRATION DATE: NORMAL
FLUAV AG UPPER RESP QL IA.RAPID: NOT DETECTED
FLUBV AG UPPER RESP QL IA.RAPID: NOT DETECTED
INTERNAL CONTROL: NORMAL
Lab: NORMAL
SARS-COV-2 AG UPPER RESP QL IA.RAPID: NOT DETECTED

## 2023-10-31 PROCEDURE — 3075F SYST BP GE 130 - 139MM HG: CPT | Performed by: NURSE PRACTITIONER

## 2023-10-31 PROCEDURE — 99213 OFFICE O/P EST LOW 20 MIN: CPT | Performed by: NURSE PRACTITIONER

## 2023-10-31 PROCEDURE — 87428 SARSCOV & INF VIR A&B AG IA: CPT | Performed by: NURSE PRACTITIONER

## 2023-10-31 PROCEDURE — 3079F DIAST BP 80-89 MM HG: CPT | Performed by: NURSE PRACTITIONER

## 2023-10-31 RX ORDER — BENZONATATE 100 MG/1
200 CAPSULE ORAL 3 TIMES DAILY PRN
Qty: 42 CAPSULE | Refills: 0 | OUTPATIENT
Start: 2023-10-31 | End: 2023-11-04

## 2023-10-31 RX ORDER — AZITHROMYCIN 250 MG/1
TABLET, FILM COATED ORAL
Qty: 6 TABLET | Refills: 0 | OUTPATIENT
Start: 2023-10-31 | End: 2023-11-04

## 2023-10-31 NOTE — PROGRESS NOTES
"Chief Complaint  URI (Congestion, cough, runny nose/X 3 days, no headache, fatigue some. )    Subjective        Christine Quick presents to [facility]  History of Present Illness    The patient is a 78-year-old female who presents today with upper respiratory infection symptoms.    The patient reports frequent, deep cough for 3 days and associated mild chest pain. She denies fever. She denies feeling \"sick\" though is uncomfortable. She notes intermittent dyspnea and wheezing, including currently, which she attributes to not having taken her Allegra today. The patient has been drinking hot tea with lemon and honey. Congestion is more in her chest/bilateral ears than her sinuses, and she notes \"everything is clear.\" She states that her voice is \"raspy\" though denies pharyngitis. Her great-granddaughter had the same symptoms last week, did not have COVID-19, and a physician suggested she had allergic rhinitis. The patient notes frequent rhinorrhea today, which she attributes to not having taken her Allegra. She denies that her cough is keeping her awake at night, as she takes anti-tussive syrup at night. She denies known antibiotic allergies and denies taking an antibiotic recently. The patient tolerated Z-Munir in the past. She suspects that her symptoms are related to allergic rhinitis.    The patient reports her balance is off and states she can tell when she needs Allegra because she feels \"really wobbly.\" She had a fall in 01/2023. 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 The patient utilizes a cane at times.    Objective   Vital Signs:  /80 (BP Location: Right arm, Patient Position: Sitting, Cuff Size: Adult)   Pulse 62   Temp 97.1 °F (36.2 °C) (Temporal)   Resp 18   Ht 162.6 cm (64.02\")   Wt 86.4 kg (190 lb 8 oz)   SpO2 95%   BMI 32.68 kg/m²   Estimated body mass index is 32.68 kg/m² as calculated from the following:    Height as of this encounter: 162.6 cm (64.02\").    " Weight as of this encounter: 86.4 kg (190 lb 8 oz).            Physical Exam  Vitals reviewed.   Constitutional:       General: She is not in acute distress.     Appearance: She is well-developed. She is not diaphoretic.   HENT:      Head: Normocephalic and atraumatic.      Right Ear: Tympanic membrane, ear canal and external ear normal.      Left Ear: Tympanic membrane, ear canal and external ear normal.      Nose:      Comments: Nasal erythema is present.     Mouth/Throat:      Pharynx: Uvula midline. No oropharyngeal exudate.   Cardiovascular:      Rate and Rhythm: Normal rate and regular rhythm.      Heart sounds: Normal heart sounds. No murmur heard.     No friction rub. No gallop.   Pulmonary:      Effort: Pulmonary effort is normal. No respiratory distress.      Breath sounds: Normal breath sounds. No wheezing or rales.   Abdominal:      General: Bowel sounds are normal. There is no distension.      Palpations: Abdomen is soft.      Tenderness: There is no abdominal tenderness.   Musculoskeletal:      Cervical back: Neck supple.   Skin:     General: Skin is warm and dry.   Neurological:      Mental Status: She is alert and oriented to person, place, and time.        Result Review :                  Assessment and Plan   Diagnoses and all orders for this visit:    1. Acute URI (Primary)  -     POCT SARS-CoV-2 Antigen ONI + Flu    Other orders  -     benzonatate (Tessalon Perles) 100 MG capsule; Take 2 capsules by mouth 3 (Three) Times a Day As Needed for Cough.  Dispense: 42 capsule; Refill: 0  -     azithromycin (Zithromax Z-Munir) 250 MG tablet; Take 2 tablets the first day, then 1 tablet daily for 4 days.  Dispense: 6 tablet; Refill: 0      1. Upper respiratory infection  - The patient's COVID-19 and influenza tests were negative today.  - Tessalon Perles were prescribed, to be taken 1 to 2 capsules every 8 hours as needed for cough. Potential side effects were discussed.  - Z-Munir was prescribed, to be  started if her symptoms worsen or do not improve by 11/04/2023.  - The patient was advised to continue supportive care including drinking tea with honey or lemon, gargling warm salt water, lozenges, fluids, and rest.    2. Allergic rhinitis  - She was advised to restart Allegra.    3. Fall history   - Physical therapy will be considered if she develops dizziness or unsteady gait.       Follow Up   No follow-ups on file.  Patient was given instructions and counseling regarding her condition or for health maintenance advice. Please see specific information pulled into the AVS if appropriate.       Transcribed from ambient dictation for ELOISA Salgado by Shaunna Mendosa.  10/31/23   17:08 EDT    Patient or patient representative verbalized consent to the visit recording.  I have personally performed the services described in this document as transcribed by the above individual, and it is both accurate and complete.

## 2023-11-02 ENCOUNTER — TELEPHONE (OUTPATIENT)
Dept: FAMILY MEDICINE CLINIC | Facility: CLINIC | Age: 78
End: 2023-11-02

## 2023-11-02 NOTE — TELEPHONE ENCOUNTER
Caller: Christine Quick     Relationship: SELF    Best call back number: [phone]     What is your medical concern?     IS IT OKAY TO START THE AZITHROMYCIN MEDICATION?  PATIENT STATES SHE IS NOT GETTING ANY BETTER.    PLEASE ADVISE.

## 2023-11-03 ENCOUNTER — TELEPHONE (OUTPATIENT)
Dept: FAMILY MEDICINE CLINIC | Facility: CLINIC | Age: 78
End: 2023-11-03

## 2023-11-03 NOTE — TELEPHONE ENCOUNTER
Caller: Christine Quick    Relationship: Self    Best call back number: [phone]    What was the call regarding: PATIENT WOULD LIKE HER AFTER VISIT SUMMARY FROM 10/31/23 MAILED TO HER HOME ADDRESS.

## 2023-11-11 ENCOUNTER — TELEPHONE (OUTPATIENT)
Dept: URGENT CARE | Facility: CLINIC | Age: 78
End: 2023-11-11
Payer: MEDICARE

## 2024-01-11 ENCOUNTER — TELEPHONE (OUTPATIENT)
Dept: FAMILY MEDICINE CLINIC | Facility: CLINIC | Age: 79
End: 2024-01-11
Payer: MEDICARE

## 2024-01-11 DIAGNOSIS — R91.1 LUNG NODULE: Primary | ICD-10-CM

## 2024-01-11 NOTE — TELEPHONE ENCOUNTER
Provider advised me the the Nurse navigator sent a message said patient was in [facility] in Nov, they done chest Xray, seen lung nodule 13x10 mm and no one had done anything with this. Provider has orders a Ct of chest. Patient is aware, I spoke to her.

## 2024-02-15 RX ORDER — LOSARTAN POTASSIUM 25 MG/1
25 TABLET ORAL DAILY
Qty: 90 TABLET | Refills: 1 | Status: SHIPPED | OUTPATIENT
Start: 2024-02-15

## 2024-02-15 RX ORDER — HYDROCHLOROTHIAZIDE 25 MG/1
25 TABLET ORAL DAILY
Qty: 90 TABLET | Refills: 1 | Status: SHIPPED | OUTPATIENT
Start: 2024-02-15

## 2024-04-09 ENCOUNTER — TELEPHONE (OUTPATIENT)
Dept: FAMILY MEDICINE CLINIC | Facility: CLINIC | Age: 79
End: 2024-04-09
Payer: MEDICARE

## 2024-08-14 RX ORDER — HYDROCHLOROTHIAZIDE 25 MG/1
25 TABLET ORAL DAILY
Qty: 90 TABLET | Refills: 1 | Status: SHIPPED | OUTPATIENT
Start: 2024-08-14

## 2024-08-14 RX ORDER — LOSARTAN POTASSIUM 25 MG/1
25 TABLET ORAL DAILY
Qty: 90 TABLET | Refills: 1 | Status: SHIPPED | OUTPATIENT
Start: 2024-08-14

## 2025-02-24 NOTE — TELEPHONE ENCOUNTER
Rx Refill Note  Requested Prescriptions     Pending Prescriptions Disp Refills    hydroCHLOROthiazide 25 MG tablet [Pharmacy Med Name: HYDROCHLOROTHIAZIDE 25MG TABLETS] 90 tablet 1     Sig: TAKE 1 TABLET BY MOUTH DAILY    losartan (COZAAR) 25 MG tablet [Pharmacy Med Name: LOSARTAN 25MG TABLETS] 90 tablet 1     Sig: TAKE 1 TABLET BY MOUTH DAILY      Last office visit with prescribing clinician: 10/31/2023   Last telemedicine visit with prescribing clinician: Visit date not found   Next office visit with prescribing clinician: Visit date not found                         Would you like a call back once the refill request has been completed: [] Yes [] No    If the office needs to give you a call back, can they leave a voicemail: [] Yes [] No    Cody Damon MA  02/24/25, 08:13 EST

## 2025-02-25 RX ORDER — LOSARTAN POTASSIUM 25 MG/1
25 TABLET ORAL DAILY
Qty: 90 TABLET | Refills: 0 | Status: SHIPPED | OUTPATIENT
Start: 2025-02-25

## 2025-02-25 RX ORDER — HYDROCHLOROTHIAZIDE 25 MG/1
25 TABLET ORAL DAILY
Qty: 90 TABLET | Refills: 0 | Status: SHIPPED | OUTPATIENT
Start: 2025-02-25

## 2025-08-20 ENCOUNTER — OFFICE VISIT (OUTPATIENT)
Dept: FAMILY MEDICINE CLINIC | Facility: CLINIC | Age: 80
End: 2025-08-20
Payer: MEDICARE

## 2025-08-20 VITALS
OXYGEN SATURATION: 95 % | DIASTOLIC BLOOD PRESSURE: 90 MMHG | BODY MASS INDEX: 33.58 KG/M2 | HEART RATE: 64 BPM | HEIGHT: 64 IN | WEIGHT: 196.7 LBS | SYSTOLIC BLOOD PRESSURE: 152 MMHG

## 2025-08-20 DIAGNOSIS — R26.9 GAIT ABNORMALITY: Primary | ICD-10-CM

## 2025-08-20 DIAGNOSIS — I10 BENIGN ESSENTIAL HYPERTENSION: ICD-10-CM

## 2025-08-20 DIAGNOSIS — E55.9 VITAMIN D DEFICIENCY: ICD-10-CM

## 2025-08-20 DIAGNOSIS — R29.6 FALLS: ICD-10-CM

## 2025-08-20 DIAGNOSIS — N39.41 URGE INCONTINENCE OF URINE: ICD-10-CM

## 2025-08-20 DIAGNOSIS — R53.83 FATIGUE, UNSPECIFIED TYPE: ICD-10-CM

## 2025-08-20 DIAGNOSIS — R73.01 IMPAIRED FASTING GLUCOSE: ICD-10-CM

## 2025-08-20 DIAGNOSIS — R79.9 ABNORMAL FINDING OF BLOOD CHEMISTRY, UNSPECIFIED: ICD-10-CM

## 2025-08-20 RX ORDER — OXYBUTYNIN CHLORIDE 5 MG/1
5 TABLET, EXTENDED RELEASE ORAL DAILY
Qty: 30 TABLET | Refills: 1 | Status: SHIPPED | OUTPATIENT
Start: 2025-08-20

## 2025-08-21 LAB
25(OH)D3+25(OH)D2 SERPL-MCNC: 36.7 NG/ML (ref 30–100)
ALBUMIN SERPL-MCNC: 4.2 G/DL (ref 3.5–5.2)
ALBUMIN/GLOB SERPL: 1.8 G/DL
ALP SERPL-CCNC: 97 U/L (ref 39–117)
ALT SERPL-CCNC: 21 U/L (ref 1–33)
AST SERPL-CCNC: 30 U/L (ref 1–32)
BASOPHILS # BLD AUTO: 0.03 10*3/MM3 (ref 0–0.2)
BASOPHILS NFR BLD AUTO: 0.9 % (ref 0–1.5)
BILIRUB SERPL-MCNC: 0.4 MG/DL (ref 0–1.2)
BUN SERPL-MCNC: 16 MG/DL (ref 8–23)
BUN/CREAT SERPL: 23.9 (ref 7–25)
CALCIUM SERPL-MCNC: 9.6 MG/DL (ref 8.6–10.5)
CHLORIDE SERPL-SCNC: 104 MMOL/L (ref 98–107)
CO2 SERPL-SCNC: 27.1 MMOL/L (ref 22–29)
CREAT SERPL-MCNC: 0.67 MG/DL (ref 0.57–1)
EGFRCR SERPLBLD CKD-EPI 2021: 87.9 ML/MIN/1.73
EOSINOPHIL # BLD AUTO: 0.07 10*3/MM3 (ref 0–0.4)
EOSINOPHIL NFR BLD AUTO: 2.1 % (ref 0.3–6.2)
ERYTHROCYTE [DISTWIDTH] IN BLOOD BY AUTOMATED COUNT: 12.1 % (ref 12.3–15.4)
FERRITIN SERPL-MCNC: 57.6 NG/ML (ref 13–150)
GLOBULIN SER CALC-MCNC: 2.4 GM/DL
GLUCOSE SERPL-MCNC: 88 MG/DL (ref 65–99)
HBA1C MFR BLD: 5.7 % (ref 4.8–5.6)
HCT VFR BLD AUTO: 40.9 % (ref 34–46.6)
HGB BLD-MCNC: 13.2 G/DL (ref 12–15.9)
IMM GRANULOCYTES # BLD AUTO: 0.01 10*3/MM3 (ref 0–0.05)
IMM GRANULOCYTES NFR BLD AUTO: 0.3 % (ref 0–0.5)
IRON SATN MFR SERPL: 21 % (ref 20–50)
IRON SERPL-MCNC: 90 MCG/DL (ref 37–145)
LYMPHOCYTES # BLD AUTO: 1.08 10*3/MM3 (ref 0.7–3.1)
LYMPHOCYTES NFR BLD AUTO: 32.2 % (ref 19.6–45.3)
MCH RBC QN AUTO: 31.4 PG (ref 26.6–33)
MCHC RBC AUTO-ENTMCNC: 32.3 G/DL (ref 31.5–35.7)
MCV RBC AUTO: 97.1 FL (ref 79–97)
MONOCYTES # BLD AUTO: 0.52 10*3/MM3 (ref 0.1–0.9)
MONOCYTES NFR BLD AUTO: 15.5 % (ref 5–12)
NEUTROPHILS # BLD AUTO: 1.64 10*3/MM3 (ref 1.7–7)
NEUTROPHILS NFR BLD AUTO: 49 % (ref 42.7–76)
NRBC BLD AUTO-RTO: 0 /100 WBC (ref 0–0.2)
PLATELET # BLD AUTO: 142 10*3/MM3 (ref 140–450)
POTASSIUM SERPL-SCNC: 3.8 MMOL/L (ref 3.5–5.2)
PROT SERPL-MCNC: 6.6 G/DL (ref 6–8.5)
RBC # BLD AUTO: 4.21 10*6/MM3 (ref 3.77–5.28)
SODIUM SERPL-SCNC: 142 MMOL/L (ref 136–145)
TIBC SERPL-MCNC: 435 MCG/DL
TSH SERPL DL<=0.005 MIU/L-ACNC: 3.27 UIU/ML (ref 0.27–4.2)
UIBC SERPL-MCNC: 345 MCG/DL (ref 112–346)
VIT B12 SERPL-MCNC: 840 PG/ML (ref 211–946)
WBC # BLD AUTO: 3.35 10*3/MM3 (ref 3.4–10.8)